# Patient Record
Sex: MALE | Race: WHITE | NOT HISPANIC OR LATINO | URBAN - METROPOLITAN AREA
[De-identification: names, ages, dates, MRNs, and addresses within clinical notes are randomized per-mention and may not be internally consistent; named-entity substitution may affect disease eponyms.]

---

## 2017-11-02 ENCOUNTER — INPATIENT (INPATIENT)
Facility: HOSPITAL | Age: 67
LOS: 9 days | Discharge: HOME | DRG: 841 | End: 2017-11-12
Attending: INTERNAL MEDICINE | Admitting: GENERAL ACUTE CARE HOSPITAL
Payer: COMMERCIAL

## 2017-11-02 VITALS
RESPIRATION RATE: 18 BRPM | HEART RATE: 82 BPM | TEMPERATURE: 99 F | OXYGEN SATURATION: 100 % | DIASTOLIC BLOOD PRESSURE: 59 MMHG | SYSTOLIC BLOOD PRESSURE: 85 MMHG

## 2017-11-02 DIAGNOSIS — D64.9 ANEMIA, UNSPECIFIED: ICD-10-CM

## 2017-11-02 LAB
ALBUMIN SERPL ELPH-MCNC: 3.1 G/DL — LOW (ref 3.3–5)
ALP SERPL-CCNC: 157 U/L — HIGH (ref 40–120)
ALT FLD-CCNC: 21 U/L RC — SIGNIFICANT CHANGE UP (ref 10–45)
ANION GAP SERPL CALC-SCNC: 15 MMOL/L — SIGNIFICANT CHANGE UP (ref 5–17)
APTT BLD: 28.3 SEC — SIGNIFICANT CHANGE UP (ref 27.5–37.4)
AST SERPL-CCNC: 31 U/L — SIGNIFICANT CHANGE UP (ref 10–40)
BILIRUB SERPL-MCNC: 0.9 MG/DL — SIGNIFICANT CHANGE UP (ref 0.2–1.2)
BLD GP AB SCN SERPL QL: NEGATIVE — SIGNIFICANT CHANGE UP
BUN SERPL-MCNC: 34 MG/DL — HIGH (ref 7–23)
CALCIUM SERPL-MCNC: 9.4 MG/DL — SIGNIFICANT CHANGE UP (ref 8.4–10.5)
CHLORIDE SERPL-SCNC: 106 MMOL/L — SIGNIFICANT CHANGE UP (ref 96–108)
CO2 SERPL-SCNC: 25 MMOL/L — SIGNIFICANT CHANGE UP (ref 22–31)
CREAT SERPL-MCNC: 1.75 MG/DL — HIGH (ref 0.5–1.3)
GLUCOSE BLDC GLUCOMTR-MCNC: 100 MG/DL — HIGH (ref 70–99)
GLUCOSE SERPL-MCNC: 115 MG/DL — HIGH (ref 70–99)
HCT VFR BLD CALC: 20.9 % — CRITICAL LOW (ref 39–50)
HGB BLD-MCNC: 7.1 G/DL — LOW (ref 13–17)
INR BLD: 1.43 RATIO — HIGH (ref 0.88–1.16)
MCHC RBC-ENTMCNC: 34.1 GM/DL — SIGNIFICANT CHANGE UP (ref 32–36)
MCHC RBC-ENTMCNC: 34.5 PG — HIGH (ref 27–34)
MCV RBC AUTO: 101 FL — HIGH (ref 80–100)
PLATELET # BLD AUTO: 22 K/UL — LOW (ref 150–400)
POTASSIUM SERPL-MCNC: 3.5 MMOL/L — SIGNIFICANT CHANGE UP (ref 3.5–5.3)
POTASSIUM SERPL-SCNC: 3.5 MMOL/L — SIGNIFICANT CHANGE UP (ref 3.5–5.3)
PROT SERPL-MCNC: 5.5 G/DL — LOW (ref 6–8.3)
PROTHROM AB SERPL-ACNC: 15.7 SEC — HIGH (ref 9.8–12.7)
RBC # BLD: 2.06 M/UL — LOW (ref 4.2–5.8)
RBC # FLD: 20.4 % — HIGH (ref 10.3–14.5)
RH IG SCN BLD-IMP: POSITIVE — SIGNIFICANT CHANGE UP
SODIUM SERPL-SCNC: 146 MMOL/L — HIGH (ref 135–145)
WBC # BLD: 30.9 K/UL — HIGH (ref 3.8–10.5)
WBC # FLD AUTO: 30.9 K/UL — HIGH (ref 3.8–10.5)

## 2017-11-02 PROCEDURE — 71010: CPT | Mod: 26

## 2017-11-02 PROCEDURE — 99285 EMERGENCY DEPT VISIT HI MDM: CPT

## 2017-11-02 RX ORDER — ALLOPURINOL 300 MG
0 TABLET ORAL
Qty: 0 | Refills: 0 | COMMUNITY

## 2017-11-02 RX ORDER — DIPHENHYDRAMINE HCL 50 MG
50 CAPSULE ORAL ONCE
Qty: 0 | Refills: 0 | Status: COMPLETED | OUTPATIENT
Start: 2017-11-02 | End: 2017-11-02

## 2017-11-02 RX ORDER — ALPRAZOLAM 0.25 MG
0.5 TABLET ORAL DAILY
Qty: 0 | Refills: 0 | Status: DISCONTINUED | OUTPATIENT
Start: 2017-11-02 | End: 2017-11-09

## 2017-11-02 RX ORDER — DIPHENOXYLATE HCL/ATROPINE 2.5-.025MG
0 TABLET ORAL
Qty: 0 | Refills: 0 | COMMUNITY

## 2017-11-02 RX ORDER — MAGNESIUM OXIDE 400 MG ORAL TABLET 241.3 MG
400 TABLET ORAL
Qty: 0 | Refills: 0 | Status: DISCONTINUED | OUTPATIENT
Start: 2017-11-02 | End: 2017-11-10

## 2017-11-02 RX ORDER — LATANOPROST 0.05 MG/ML
1 SOLUTION/ DROPS OPHTHALMIC; TOPICAL AT BEDTIME
Qty: 0 | Refills: 0 | Status: DISCONTINUED | OUTPATIENT
Start: 2017-11-02 | End: 2017-11-02

## 2017-11-02 RX ORDER — ACYCLOVIR SODIUM 500 MG
400 VIAL (EA) INTRAVENOUS
Qty: 0 | Refills: 0 | Status: DISCONTINUED | OUTPATIENT
Start: 2017-11-02 | End: 2017-11-12

## 2017-11-02 RX ORDER — SODIUM CHLORIDE 9 MG/ML
1000 INJECTION INTRAMUSCULAR; INTRAVENOUS; SUBCUTANEOUS
Qty: 0 | Refills: 0 | Status: DISCONTINUED | OUTPATIENT
Start: 2017-11-02 | End: 2017-11-03

## 2017-11-02 RX ORDER — TIMOLOL 0.5 %
1 DROPS OPHTHALMIC (EYE) DAILY
Qty: 0 | Refills: 0 | Status: DISCONTINUED | OUTPATIENT
Start: 2017-11-02 | End: 2017-11-12

## 2017-11-02 RX ORDER — DIPHENOXYLATE HCL/ATROPINE 2.5-.025MG
1 TABLET ORAL THREE TIMES A DAY
Qty: 0 | Refills: 0 | Status: DISCONTINUED | OUTPATIENT
Start: 2017-11-02 | End: 2017-11-07

## 2017-11-02 RX ORDER — MAGNESIUM OXIDE/MAG AA CHELATE 133 MG
0 TABLET ORAL
Qty: 0 | Refills: 0 | COMMUNITY

## 2017-11-02 RX ORDER — ACYCLOVIR SODIUM 500 MG
0 VIAL (EA) INTRAVENOUS
Qty: 0 | Refills: 0 | COMMUNITY

## 2017-11-02 RX ORDER — SODIUM CHLORIDE 9 MG/ML
1000 INJECTION, SOLUTION INTRAVENOUS
Qty: 0 | Refills: 0 | Status: DISCONTINUED | OUTPATIENT
Start: 2017-11-02 | End: 2017-11-02

## 2017-11-02 RX ORDER — POSACONAZOLE 100 MG/1
0 TABLET, DELAYED RELEASE ORAL
Qty: 0 | Refills: 0 | COMMUNITY

## 2017-11-02 RX ORDER — ALLOPURINOL 300 MG
300 TABLET ORAL DAILY
Qty: 0 | Refills: 0 | Status: DISCONTINUED | OUTPATIENT
Start: 2017-11-02 | End: 2017-11-02

## 2017-11-02 RX ORDER — LOPERAMIDE HCL 2 MG
0 TABLET ORAL
Qty: 0 | Refills: 0 | COMMUNITY

## 2017-11-02 RX ORDER — ALLOPURINOL 300 MG
1 TABLET ORAL
Qty: 0 | Refills: 0 | COMMUNITY

## 2017-11-02 RX ORDER — METOPROLOL TARTRATE 50 MG
751 TABLET ORAL
Qty: 0 | Refills: 0 | COMMUNITY

## 2017-11-02 RX ORDER — POSACONAZOLE 100 MG/1
100 TABLET, DELAYED RELEASE ORAL DAILY
Qty: 0 | Refills: 0 | Status: DISCONTINUED | OUTPATIENT
Start: 2017-11-02 | End: 2017-11-12

## 2017-11-02 RX ORDER — ACETAMINOPHEN 500 MG
975 TABLET ORAL ONCE
Qty: 0 | Refills: 0 | Status: DISCONTINUED | OUTPATIENT
Start: 2017-11-02 | End: 2017-11-02

## 2017-11-02 RX ORDER — BACITRACIN ZINC 500 UNIT/G
1 OINTMENT IN PACKET (EA) TOPICAL DAILY
Qty: 0 | Refills: 0 | Status: DISCONTINUED | OUTPATIENT
Start: 2017-11-02 | End: 2017-11-12

## 2017-11-02 RX ORDER — METOPROLOL TARTRATE 50 MG
0 TABLET ORAL
Qty: 0 | Refills: 0 | COMMUNITY

## 2017-11-02 RX ORDER — DIPHENHYDRAMINE HCL 50 MG
25 CAPSULE ORAL ONCE
Qty: 0 | Refills: 0 | Status: DISCONTINUED | OUTPATIENT
Start: 2017-11-02 | End: 2017-11-02

## 2017-11-02 RX ORDER — METOPROLOL TARTRATE 50 MG
25 TABLET ORAL DAILY
Qty: 0 | Refills: 0 | Status: DISCONTINUED | OUTPATIENT
Start: 2017-11-02 | End: 2017-11-02

## 2017-11-02 RX ORDER — LATANOPROST 0.05 MG/ML
1 SOLUTION/ DROPS OPHTHALMIC; TOPICAL
Qty: 0 | Refills: 0 | COMMUNITY

## 2017-11-02 RX ORDER — MORPHINE 10 MG/ML
6 SOLUTION ORAL
Qty: 0 | Refills: 0 | Status: DISCONTINUED | OUTPATIENT
Start: 2017-11-02 | End: 2017-11-03

## 2017-11-02 RX ORDER — ACETAMINOPHEN 500 MG
975 TABLET ORAL ONCE
Qty: 0 | Refills: 0 | Status: COMPLETED | OUTPATIENT
Start: 2017-11-02 | End: 2017-11-02

## 2017-11-02 RX ORDER — LOPERAMIDE HCL 2 MG
2 TABLET ORAL
Qty: 0 | Refills: 0 | Status: DISCONTINUED | OUTPATIENT
Start: 2017-11-02 | End: 2017-11-12

## 2017-11-02 RX ORDER — ALLOPURINOL 300 MG
300 TABLET ORAL DAILY
Qty: 0 | Refills: 0 | Status: DISCONTINUED | OUTPATIENT
Start: 2017-11-02 | End: 2017-11-12

## 2017-11-02 RX ORDER — LANOLIN ALCOHOL/MO/W.PET/CERES
3 CREAM (GRAM) TOPICAL AT BEDTIME
Qty: 0 | Refills: 0 | Status: DISCONTINUED | OUTPATIENT
Start: 2017-11-02 | End: 2017-11-12

## 2017-11-02 RX ORDER — LATANOPROST 0.05 MG/ML
1 SOLUTION/ DROPS OPHTHALMIC; TOPICAL AT BEDTIME
Qty: 0 | Refills: 0 | Status: DISCONTINUED | OUTPATIENT
Start: 2017-11-02 | End: 2017-11-12

## 2017-11-02 RX ORDER — RAMELTEON 8 MG
0 TABLET ORAL
Qty: 0 | Refills: 0 | COMMUNITY

## 2017-11-02 RX ORDER — TIMOLOL 0.5 %
1 DROPS OPHTHALMIC (EYE)
Qty: 0 | Refills: 0 | COMMUNITY

## 2017-11-02 RX ORDER — NYSTATIN CREAM 100000 [USP'U]/G
0 CREAM TOPICAL
Qty: 0 | Refills: 0 | COMMUNITY

## 2017-11-02 RX ADMIN — Medication 975 MILLIGRAM(S): at 19:05

## 2017-11-02 RX ADMIN — Medication 50 MILLIGRAM(S): at 18:00

## 2017-11-02 RX ADMIN — Medication 975 MILLIGRAM(S): at 18:00

## 2017-11-02 NOTE — H&P ADULT - NSHPPHYSICALEXAM_GEN_ALL_CORE
PHYSICAL EXAMINATION:  Vital Signs Last 24 Hrs  T(C): 36.6 (02 Nov 2017 14:29), Max: 37.2 (02 Nov 2017 13:59)  T(F): 97.8 (02 Nov 2017 14:29), Max: 98.9 (02 Nov 2017 13:59)  HR: 79 (02 Nov 2017 14:29) (79 - 82)  BP: 98/59 (02 Nov 2017 14:29) (85/59 - 98/59)  BP(mean): --  RR: 18 (02 Nov 2017 14:29) (18 - 18)  SpO2: 100% (02 Nov 2017 14:29) (100% - 100%)  CAPILLARY BLOOD GLUCOSE            GENERAL: NAD, well-groomed, well-developed  HEAD:  atraumatic, normocephalic  EYES: sclera anicteric  ENMT: mucous membranes moist  NECK: supple, No JVD  CHEST/LUNG: clear to auscultation bilaterally; no rales, rhonchi, or wheezing b/l  HEART: normal S1, S2  ABDOMEN: BS+, soft, ND, NT   EXTREMITIES:  pulses palpable; no clubbing, cyanosis, or edema b/l LEs  NEURO: awake, alert, interactive; moves all extremities  SKIN: no rashes or lesions PHYSICAL EXAMINATION:  Vital Signs Last 24 Hrs  T(C): 36.6 (02 Nov 2017 14:29), Max: 37.2 (02 Nov 2017 13:59)  T(F): 97.8 (02 Nov 2017 14:29), Max: 98.9 (02 Nov 2017 13:59)  HR: 79 (02 Nov 2017 14:29) (79 - 82)  BP: 98/59 (02 Nov 2017 14:29) (85/59 - 98/59)  BP(mean): --  RR: 18 (02 Nov 2017 14:29) (18 - 18)  SpO2: 100% (02 Nov 2017 14:29) (100% - 100%)  CAPILLARY BLOOD GLUCOSE            GENERAL: NAD, well-groomed, well-developed  HEAD:  atraumatic, normocephalic  EYES: sclera anicteric  ENMT: mucous membranes moist  NECK: supple, No JVD  CHEST/LUNG: clear to auscultation bilaterally; no rales, rhonchi, or wheezing b/l  HEART: normal S1, S2  ABDOMEN: BS+, soft, ND, NT , ecchymoses  on  anterior  abdominal   wall, , firm  mass, llq.  famliy  states its  from  heparin injections  EXTREMITIES:    no  edema b/l LEs  NEURO: awake, alert, interactive; moves all extremities  SKIN: no rashes or lesions PHYSICAL EXAMINATION:  Vital Signs Last 24 Hrs  T(C): 36.6 (02 Nov 2017 14:29), Max: 37.2 (02 Nov 2017 13:59)  T(F): 97.8 (02 Nov 2017 14:29), Max: 98.9 (02 Nov 2017 13:59)  HR: 79 (02 Nov 2017 14:29) (79 - 82)  BP: 98/59 (02 Nov 2017 14:29) (85/59 - 98/59)  BP(mean): --  RR: 18 (02 Nov 2017 14:29) (18 - 18)  SpO2: 100% (02 Nov 2017 14:29) (100% - 100%)  CAPILLARY BLOOD GLUCOSE            GENERAL: NAD, well-groomed, well-developed  HEAD:  atraumatic, normocephalic  EYES: sclera anicteric  ENMT: mucous membranes moist  NECK: supple, No JVD  CHEST/LUNG: clear to auscultation bilaterally; no rales, rhonchi, or wheezing b/l  HEART: normal S1, S2  ABDOMEN: BS+, soft, ND, NT , ecchymoses  on  anterior  abdominal   wall, , firm  mass, llq.  famliy  states its  from  heparin injections  EXTREMITIES:    no  edema b/l LEs  NEURO: awake, alert, interactive; moves all extremities  SKIN:  ecchymoses,  anterior  abd  wall,  has  PICc  line, left  arm,  with  small  area of open wound, at site od  picc line, dressing

## 2017-11-02 NOTE — H&P ADULT - ASSESSMENT
pt  with  h/o  MDS, now  with  AML, s/op  mlple  trials  at  [facility]  sent  to  er  for  prbc   onc  dr llanos  will  see pt   pt  will be  place d on  a novel  oral  agent daily a nd  s/q  Theodora C,  daily  for  5  days  labs, pending  today    daily  cbc  dvt  ppx pt  with  h/o  MDS, now  with  AML, s/ p  mlple  trials  at  [facility], with progression  of  disease  sent  to  er  for  prbc   onc  dr llanos  will  see pt  on acyclovir/ Augmentin  and posaconazole  , daily  for  prophylaxis   pt  will be  place d on  a novel  oral  agent daily .  and   s/q  Theodora C,  daily  for  5  days  labs, pending  today    daily  cbc  dvt  ppx  spoke  with  wife  dr berkowitz  will assume  care of  pt pt  with  h/o  MDS, now  with  AML, s/ p  mlple  trials  at  [facility], with progression  of  disease  sent  to  er  for  prbc   onc  dr llanos  will  see pt  on acyclovir/ Augmentin  and posaconazole  , daily  for  prophylaxis   pt  will be  place d on  a novel  oral  agent daily .  and   s/q  Theodora C,  daily  for  5  days  labs, pending  today    daily  cbc  di, in  er    pt  with  hypotension in  er,  dc  lopressor  on iv  n  saline,  follow  bp  curve  dvt  ppx  spoke  with  wife  dr berkowitz  will assume  care of  pt pt  with  h/o  MDS, now  with  AML, s/ p  mlple  trials  at  [facility], with progression  of  disease  sent  to  er  for  prbc   onc  dr llanos  will  see pt  on acyclovir/ Augmentin  and posaconazole  , daily  for  prophylaxis   pt  will be  place d on  a novel  oral  agent daily .  and   s/q  Theodora C,  daily  for  5  days  labs, pending  today    daily  cbc  di, in  er    pt  with  hypotension in  er,  dc  lopressor  on iv  n  saline,  follow  bp  curve  PICC line, left upper  arm  dvt  ppx  spoke  with  wife  dr berkowitz  will assume  care of  pt

## 2017-11-02 NOTE — ED PROVIDER NOTE - ATTENDING CONTRIBUTION TO CARE
68 yo M present with anemia. He had bloodwork done today which demonstrated Hb7. His normal ranges 8-10. He has a h/o MDS and leukemia, on chemotherapy. Follows at [facility]. is transfusion dependent. last transfusion was last week. For the past 4 days he has felt extremely generalized weakness and with no energy.   PE with chronicallly ill appearing. pale.. brusing on his abdomen 66 yo M present with anemia. He had bloodwork done today which demonstrated Hb7. His normal ranges 8-10. He has a h/o MDS and leukemia, on chemotherapy. Follows at [facility]. is transfusion dependent. last transfusion was last week. For the past 4 days he has felt extremely generalized weakness and with no energy.   PE with chronicallly ill appearing. pale.. bruising on his abdomen, otherwise nontender to palpation  VS stable.  ED workup reveals H/H 7.9 and 26. JONNA with BUN 30, CR 1.46. We consulted hem/onc who asked us to admit patietn to the hospitalist. Dr. Roland came to evaluate patient herself in the ER. Lab results pending at time of admission, Dr Rosa stated that she would follow results and order transfuse as indicated herself    Based on patient's HPI as well as the results of today's workup, I felt that patient warranted admission to the hospital for further workup/evaluation and continued management. I discussed the findings of today's workup with the patient and addressed the patient's questions and concerns. The patient was agreeable with admission. I spoke with the hospitalist who accepted the patient for admission and subsequently took over the patient's care.

## 2017-11-02 NOTE — ED PROVIDER NOTE - MEDICAL DECISION MAKING DETAILS
Shanthi PGY3: anemia in leukemia patient requiring admission for new chemotherapy. Will obtain consent and transfuse and coordinate plan with heme/onc team

## 2017-11-02 NOTE — ED PROVIDER NOTE - PHYSICAL EXAMINATION
Shanthi: A & O x 3, NAD, HEENT WNL and no facial asymmetry; lungs CTAB, heart with reg rhythm; abdomen soft obese NTND; extremities with picc line on left; skin with no rashes, neuro exam non focal with no motor or sensory deficits Shanthi: A & O x 3, NAD, HEENT WNL and no facial asymmetry; dry MM, lungs CTAB, heart with reg rhythm; abdomen soft obese NTND; extremities with picc line on left; skin with no rashes, neuro exam non focal with no motor or sensory deficits, back with no spinal TTP

## 2017-11-02 NOTE — H&P ADULT - HISTORY OF PRESENT ILLNESS
67y Male complaining of  weakness	  : 67 year old, leukemia / AML,  progressed  from , MDS,    h/o  dm, presenting with low h/h.  patient follows with [facility] and has  had  mlple  transfusions, . a nd  was  on  numerous  trials, which  have  not  worked, .  ,  denies,  blood in stool. no vomiting. no fever . Sent in for  transfusion and inpatient chemo that he was recently approved for with insurance. pos fatigue and diarrhea. history of diarrhea but neg c diff. Currently on augmentin and pozaconaoxole.    Onc: [facility] PMD: Bob Christine ([facility]) 67y Male complaining of  weakness,  sent  to  er  bt  oncologist	  : 67 year old, leukemia / AML,  progressed  from , MDS,, now  with  anemia    h/o  dm,   patient follows with [facility] and has  had  mlple  transfusions, .  was  on  numerous  trials, which  have  not  worked, .  ,  denies,  blood in stool. no vomiting. no fever . Sent in for  transfusion and inpatient chemo that he was recently approved for with insurance. pos fatigue and diarrhea. history of diarrhea but neg c diff . Currently on augmentin and pozaconaoxole,  and  acyclovir,  fro  ppx.    Onc: [facility] 67y Male complaining of  weakness,  sent  to  er  by  oncologist, for  anemia	  : 67 year old, leukemia / AML,  progressed  from , MDS,, now  with  anemia    h/o  dm,   patient follows with [facility] and has  had  mlple  transfusions, .  was  on  numerous  trials, which  have  not  worked, .  ,  denies,  blood in stool. no vomiting. no fever . Sent in for  transfusion and inpatient chemo that he was recently approved for with insurance. pos fatigue and diarrhea. history of diarrhea but neg c diff . Currently on augmentin and pozaconaoxole,  and  acyclovir,  fro  ppx.    Onc: [facility]

## 2017-11-02 NOTE — ED PROVIDER NOTE - DIAGNOSIS COUNSELING, MDM
conducted a detailed discussion... I reviewed all lab and imaging results from this ED visit, and discussed ALL results with the patient, including all abnormal results and incidental findings. I recommened appropriate follow up for all incidental findings. The patient expressed understanding of all results discussed and follow up instructions given.

## 2017-11-02 NOTE — ED ADULT NURSE NOTE - OBJECTIVE STATEMENT
68 y/o male BIB wife for low h/h, pt sent in for transfusion.  Patient  denies blood in stool, vomiting, fever, chills.   Pt c/o feeling tired and diarrhea.  History of diarrhea.   Left arm PICC line.  (+) bruise to lower abd and buttock.  Stage II to sacrum  and surrounding redness.

## 2017-11-02 NOTE — ED PROVIDER NOTE - CARE PLAN
Principal Discharge DX:	Anemia Principal Discharge DX:	Anemia  Secondary Diagnosis:	Leukemia not having achieved remission, unspecified leukemia type

## 2017-11-02 NOTE — H&P ADULT - NSHPREVIEWOFSYSTEMS_GEN_ALL_CORE
REVIEW OF SYSTEMS:    CONSTITUTIONAL:   has  weakness, fevers or chills  EYES/ENT: No visual changes;    NECK: No pain   RESPIRATORY: No cough, wheezing, hemoptysis;  shortness of breath, on exertion  CARDIOVASCULAR: No chest pain or palpitations  GASTROINTESTINAL: No abdominal or epigastric pain. No nausea, vomiting, or hematemesis; No diarrhea or constipation. No melena or hematochezia.  GENITOURINARY: No dysuria, frequency   NEUROLOGICAL: No  focal  weakness  SKIN   No  rash  PSYCH    Alert REVIEW OF SYSTEMS:    CONSTITUTIONAL:   has  weakness, fevers or chills  EYES/ENT: No visual changes;    NECK: No pain   RESPIRATORY: No cough, wheezing, hemoptysis;  shortness of breath, on exertion  CARDIOVASCULAR: No chest pain or palpitations  GASTROINTESTINAL: No abdominal or epigastric pain. No nausea, vomiting, or hematemesis; . c/c  diarrhea,    no  constipation. No melena or hematochezia.  GENITOURINARY: No dysuria, frequency   NEUROLOGICAL: No  focal  weakness  SKIN   No  rash  PSYCH    Alert

## 2017-11-02 NOTE — ED PROVIDER NOTE - OBJECTIVE STATEMENT
67 year old, leukemia (diagnosed in april), mds, dm, presenting with low h/h. patient follows with [facility] and has sometimes many transfusions. no blood in stool. no vomiting. no fever. Sent in for acute transfusion and inpatient chemo that he was recently approved for with insurance. pos fatigue and diarrhea. history of diarrhea but neg c diff. Currently on augmentin and pozaconaoxole.     Onc: [facility]  PMD: Bob Christine ([facility])

## 2017-11-03 LAB
ALBUMIN SERPL ELPH-MCNC: 3.2 G/DL — LOW (ref 3.3–5)
ALP SERPL-CCNC: 154 U/L — HIGH (ref 40–120)
ALT FLD-CCNC: 17 U/L RC — SIGNIFICANT CHANGE UP (ref 10–45)
ANION GAP SERPL CALC-SCNC: 10 MMOL/L — SIGNIFICANT CHANGE UP (ref 5–17)
ANION GAP SERPL CALC-SCNC: 12 MMOL/L — SIGNIFICANT CHANGE UP (ref 5–17)
AST SERPL-CCNC: 32 U/L — SIGNIFICANT CHANGE UP (ref 10–40)
BASE EXCESS BLDV CALC-SCNC: 3.9 MMOL/L — HIGH (ref -2–2)
BASOPHILS # BLD AUTO: 0 K/UL — SIGNIFICANT CHANGE UP (ref 0–0.2)
BILIRUB SERPL-MCNC: 0.8 MG/DL — SIGNIFICANT CHANGE UP (ref 0.2–1.2)
BUN SERPL-MCNC: 23 MG/DL — SIGNIFICANT CHANGE UP (ref 7–23)
BUN SERPL-MCNC: 28 MG/DL — HIGH (ref 7–23)
CA-I SERPL-SCNC: 1.23 MMOL/L — SIGNIFICANT CHANGE UP (ref 1.12–1.3)
CALCIUM SERPL-MCNC: 8.8 MG/DL — SIGNIFICANT CHANGE UP (ref 8.4–10.5)
CALCIUM SERPL-MCNC: 9.2 MG/DL — SIGNIFICANT CHANGE UP (ref 8.4–10.5)
CHLORIDE BLDV-SCNC: 109 MMOL/L — HIGH (ref 96–108)
CHLORIDE SERPL-SCNC: 105 MMOL/L — SIGNIFICANT CHANGE UP (ref 96–108)
CHLORIDE SERPL-SCNC: 108 MMOL/L — SIGNIFICANT CHANGE UP (ref 96–108)
CO2 BLDV-SCNC: 29 MMOL/L — SIGNIFICANT CHANGE UP (ref 22–30)
CO2 SERPL-SCNC: 27 MMOL/L — SIGNIFICANT CHANGE UP (ref 22–31)
CO2 SERPL-SCNC: 29 MMOL/L — SIGNIFICANT CHANGE UP (ref 22–31)
CREAT SERPL-MCNC: 1.47 MG/DL — HIGH (ref 0.5–1.3)
CREAT SERPL-MCNC: 1.49 MG/DL — HIGH (ref 0.5–1.3)
EOSINOPHIL # BLD AUTO: 0 K/UL — SIGNIFICANT CHANGE UP (ref 0–0.5)
GAS PNL BLDV: 142 MMOL/L — SIGNIFICANT CHANGE UP (ref 136–145)
GAS PNL BLDV: SIGNIFICANT CHANGE UP
GAS PNL BLDV: SIGNIFICANT CHANGE UP
GLUCOSE BLDC GLUCOMTR-MCNC: 113 MG/DL — HIGH (ref 70–99)
GLUCOSE BLDC GLUCOMTR-MCNC: 119 MG/DL — HIGH (ref 70–99)
GLUCOSE BLDC GLUCOMTR-MCNC: 84 MG/DL — SIGNIFICANT CHANGE UP (ref 70–99)
GLUCOSE BLDC GLUCOMTR-MCNC: 93 MG/DL — SIGNIFICANT CHANGE UP (ref 70–99)
GLUCOSE BLDV-MCNC: 98 MG/DL — SIGNIFICANT CHANGE UP (ref 70–99)
GLUCOSE SERPL-MCNC: 102 MG/DL — HIGH (ref 70–99)
GLUCOSE SERPL-MCNC: 82 MG/DL — SIGNIFICANT CHANGE UP (ref 70–99)
HCO3 BLDV-SCNC: 28 MMOL/L — SIGNIFICANT CHANGE UP (ref 21–29)
HCT VFR BLD CALC: 22.7 % — LOW (ref 39–50)
HCT VFR BLD CALC: 25.8 % — LOW (ref 39–50)
HCT VFR BLDA CALC: 25 % — LOW (ref 39–50)
HGB BLD CALC-MCNC: 8 G/DL — LOW (ref 13–17)
HGB BLD-MCNC: 7.7 G/DL — LOW (ref 13–17)
HGB BLD-MCNC: 9.1 G/DL — LOW (ref 13–17)
LACTATE BLDV-MCNC: 1.5 MMOL/L — SIGNIFICANT CHANGE UP (ref 0.7–2)
LYMPHOCYTES # BLD AUTO: 19 % — SIGNIFICANT CHANGE UP (ref 13–44)
LYMPHOCYTES # BLD AUTO: 3.5 K/UL — HIGH (ref 1–3.3)
MAGNESIUM SERPL-MCNC: 1.9 MG/DL — SIGNIFICANT CHANGE UP (ref 1.6–2.6)
MCHC RBC-ENTMCNC: 33 PG — SIGNIFICANT CHANGE UP (ref 27–34)
MCHC RBC-ENTMCNC: 33.3 PG — SIGNIFICANT CHANGE UP (ref 27–34)
MCHC RBC-ENTMCNC: 34.2 GM/DL — SIGNIFICANT CHANGE UP (ref 32–36)
MCHC RBC-ENTMCNC: 35.2 GM/DL — SIGNIFICANT CHANGE UP (ref 32–36)
MCV RBC AUTO: 94.6 FL — SIGNIFICANT CHANGE UP (ref 80–100)
MCV RBC AUTO: 96.5 FL — SIGNIFICANT CHANGE UP (ref 80–100)
MONOCYTES # BLD AUTO: 3.9 K/UL — HIGH (ref 0–0.9)
MONOCYTES NFR BLD AUTO: 16 % — HIGH (ref 2–14)
NEUTROPHILS # BLD AUTO: SIGNIFICANT CHANGE UP (ref 1.8–7.4)
NEUTROPHILS NFR BLD AUTO: 0 % — SIGNIFICANT CHANGE UP (ref 43–77)
OTHER CELLS CSF MANUAL: 6 ML/DL — LOW (ref 18–22)
PCO2 BLDV: 40 MMHG — SIGNIFICANT CHANGE UP (ref 35–50)
PH BLDV: 7.46 — HIGH (ref 7.35–7.45)
PHOSPHATE SERPL-MCNC: 2.9 MG/DL — SIGNIFICANT CHANGE UP (ref 2.5–4.5)
PHOSPHATE SERPL-MCNC: 3.3 MG/DL — SIGNIFICANT CHANGE UP (ref 2.5–4.5)
PLATELET # BLD AUTO: 15 K/UL — CRITICAL LOW (ref 150–400)
PLATELET # BLD AUTO: 38 K/UL — LOW (ref 150–400)
PO2 BLDV: 31 MMHG — SIGNIFICANT CHANGE UP (ref 25–45)
POTASSIUM BLDV-SCNC: 2.7 MMOL/L — CRITICAL LOW (ref 3.5–5)
POTASSIUM SERPL-MCNC: 2.9 MMOL/L — CRITICAL LOW (ref 3.5–5.3)
POTASSIUM SERPL-MCNC: 4.2 MMOL/L — SIGNIFICANT CHANGE UP (ref 3.5–5.3)
POTASSIUM SERPL-SCNC: 2.9 MMOL/L — CRITICAL LOW (ref 3.5–5.3)
POTASSIUM SERPL-SCNC: 4.2 MMOL/L — SIGNIFICANT CHANGE UP (ref 3.5–5.3)
PROT SERPL-MCNC: 5.4 G/DL — LOW (ref 6–8.3)
RBC # BLD: 2.35 M/UL — LOW (ref 4.2–5.8)
RBC # BLD: 2.73 M/UL — LOW (ref 4.2–5.8)
RBC # FLD: 21.3 % — HIGH (ref 10.3–14.5)
RBC # FLD: 22.6 % — HIGH (ref 10.3–14.5)
SAO2 % BLDV: 57 % — LOW (ref 67–88)
SODIUM SERPL-SCNC: 144 MMOL/L — SIGNIFICANT CHANGE UP (ref 135–145)
SODIUM SERPL-SCNC: 147 MMOL/L — HIGH (ref 135–145)
URATE SERPL-MCNC: 6.5 MG/DL — SIGNIFICANT CHANGE UP (ref 3.4–8.8)
WBC # BLD: 23.8 K/UL — HIGH (ref 3.8–10.5)
WBC # BLD: 39.7 K/UL — HIGH (ref 3.8–10.5)
WBC # FLD AUTO: 23.8 K/UL — HIGH (ref 3.8–10.5)
WBC # FLD AUTO: 39.7 K/UL — HIGH (ref 3.8–10.5)

## 2017-11-03 PROCEDURE — 85060 BLOOD SMEAR INTERPRETATION: CPT

## 2017-11-03 RX ORDER — ONDANSETRON 8 MG/1
16 TABLET, FILM COATED ORAL DAILY
Qty: 0 | Refills: 0 | Status: COMPLETED | OUTPATIENT
Start: 2017-11-03 | End: 2017-11-05

## 2017-11-03 RX ORDER — SODIUM CHLORIDE 9 MG/ML
1000 INJECTION, SOLUTION INTRAVENOUS
Qty: 0 | Refills: 0 | Status: DISCONTINUED | OUTPATIENT
Start: 2017-11-03 | End: 2017-11-08

## 2017-11-03 RX ORDER — MORPHINE 10 MG/ML
6 SOLUTION ORAL
Qty: 0 | Refills: 0 | Status: DISCONTINUED | OUTPATIENT
Start: 2017-11-03 | End: 2017-11-05

## 2017-11-03 RX ORDER — ACETAMINOPHEN 500 MG
650 TABLET ORAL ONCE
Qty: 0 | Refills: 0 | Status: COMPLETED | OUTPATIENT
Start: 2017-11-03 | End: 2017-11-03

## 2017-11-03 RX ORDER — SODIUM CHLORIDE 9 MG/ML
1000 INJECTION, SOLUTION INTRAVENOUS
Qty: 0 | Refills: 0 | Status: DISCONTINUED | OUTPATIENT
Start: 2017-11-03 | End: 2017-11-03

## 2017-11-03 RX ORDER — MAGNESIUM SULFATE 500 MG/ML
1 VIAL (ML) INJECTION ONCE
Qty: 0 | Refills: 0 | Status: DISCONTINUED | OUTPATIENT
Start: 2017-11-03 | End: 2017-11-03

## 2017-11-03 RX ORDER — DIPHENHYDRAMINE HCL 50 MG
50 CAPSULE ORAL EVERY 4 HOURS
Qty: 0 | Refills: 0 | Status: DISCONTINUED | OUTPATIENT
Start: 2017-11-03 | End: 2017-11-04

## 2017-11-03 RX ORDER — POTASSIUM CHLORIDE 20 MEQ
40 PACKET (EA) ORAL EVERY 4 HOURS
Qty: 0 | Refills: 0 | Status: COMPLETED | OUTPATIENT
Start: 2017-11-03 | End: 2017-11-03

## 2017-11-03 RX ORDER — POTASSIUM CHLORIDE 20 MEQ
10 PACKET (EA) ORAL
Qty: 0 | Refills: 0 | Status: DISCONTINUED | OUTPATIENT
Start: 2017-11-03 | End: 2017-11-03

## 2017-11-03 RX ORDER — VENETOCLAX 100 MG/1
150 TABLET, FILM COATED ORAL ONCE
Qty: 0 | Refills: 0 | Status: COMPLETED | OUTPATIENT
Start: 2017-11-03 | End: 2017-11-03

## 2017-11-03 RX ORDER — POTASSIUM CHLORIDE 20 MEQ
40 PACKET (EA) ORAL ONCE
Qty: 0 | Refills: 0 | Status: COMPLETED | OUTPATIENT
Start: 2017-11-03 | End: 2017-11-03

## 2017-11-03 RX ORDER — POTASSIUM CHLORIDE 20 MEQ
20 PACKET (EA) ORAL
Qty: 0 | Refills: 0 | Status: DISCONTINUED | OUTPATIENT
Start: 2017-11-03 | End: 2017-11-03

## 2017-11-03 RX ORDER — FUROSEMIDE 40 MG
20 TABLET ORAL ONCE
Qty: 0 | Refills: 0 | Status: COMPLETED | OUTPATIENT
Start: 2017-11-03 | End: 2017-11-03

## 2017-11-03 RX ORDER — CYTARABINE 100 MG
46 VIAL (EA) INJECTION DAILY
Qty: 0 | Refills: 0 | Status: DISCONTINUED | OUTPATIENT
Start: 2017-11-03 | End: 2017-11-12

## 2017-11-03 RX ADMIN — Medication 3 MILLIGRAM(S): at 22:51

## 2017-11-03 RX ADMIN — MAGNESIUM OXIDE 400 MG ORAL TABLET 400 MILLIGRAM(S): 241.3 TABLET ORAL at 12:10

## 2017-11-03 RX ADMIN — Medication 1 TABLET(S): at 13:04

## 2017-11-03 RX ADMIN — MORPHINE 6 MILLIGRAM(S): 10 SOLUTION ORAL at 12:10

## 2017-11-03 RX ADMIN — Medication 400 MILLIGRAM(S): at 00:27

## 2017-11-03 RX ADMIN — Medication 40 MILLIEQUIVALENT(S): at 19:09

## 2017-11-03 RX ADMIN — LATANOPROST 1 DROP(S): 0.05 SOLUTION/ DROPS OPHTHALMIC; TOPICAL at 21:27

## 2017-11-03 RX ADMIN — Medication 40 MILLIEQUIVALENT(S): at 16:38

## 2017-11-03 RX ADMIN — POSACONAZOLE 100 MILLIGRAM(S): 100 TABLET, DELAYED RELEASE ORAL at 14:42

## 2017-11-03 RX ADMIN — Medication 400 MILLIGRAM(S): at 06:41

## 2017-11-03 RX ADMIN — LATANOPROST 1 DROP(S): 0.05 SOLUTION/ DROPS OPHTHALMIC; TOPICAL at 00:29

## 2017-11-03 RX ADMIN — Medication 1 TABLET(S): at 21:27

## 2017-11-03 RX ADMIN — Medication 875 MILLIGRAM(S): at 06:41

## 2017-11-03 RX ADMIN — MORPHINE 6 MILLIGRAM(S): 10 SOLUTION ORAL at 06:41

## 2017-11-03 RX ADMIN — ONDANSETRON 116 MILLIGRAM(S): 8 TABLET, FILM COATED ORAL at 13:30

## 2017-11-03 RX ADMIN — Medication 100 MILLIEQUIVALENT(S): at 09:26

## 2017-11-03 RX ADMIN — VENETOCLAX 150 MILLIGRAM(S): 100 TABLET, FILM COATED ORAL at 14:00

## 2017-11-03 RX ADMIN — Medication 1 TABLET(S): at 00:29

## 2017-11-03 RX ADMIN — Medication 650 MILLIGRAM(S): at 13:04

## 2017-11-03 RX ADMIN — Medication 0.5 MILLIGRAM(S): at 12:48

## 2017-11-03 RX ADMIN — SODIUM CHLORIDE 75 MILLILITER(S): 9 INJECTION, SOLUTION INTRAVENOUS at 15:42

## 2017-11-03 RX ADMIN — Medication 300 MILLIGRAM(S): at 12:10

## 2017-11-03 RX ADMIN — Medication 400 MILLIGRAM(S): at 17:40

## 2017-11-03 RX ADMIN — MAGNESIUM OXIDE 400 MG ORAL TABLET 400 MILLIGRAM(S): 241.3 TABLET ORAL at 09:13

## 2017-11-03 RX ADMIN — Medication 50 MILLIEQUIVALENT(S): at 14:23

## 2017-11-03 RX ADMIN — Medication 1 APPLICATION(S): at 12:10

## 2017-11-03 RX ADMIN — Medication 40 MILLIEQUIVALENT(S): at 09:13

## 2017-11-03 RX ADMIN — Medication 875 MILLIGRAM(S): at 00:27

## 2017-11-03 RX ADMIN — Medication 3 MILLIGRAM(S): at 00:26

## 2017-11-03 RX ADMIN — Medication 100 MILLIEQUIVALENT(S): at 09:13

## 2017-11-03 RX ADMIN — MAGNESIUM OXIDE 400 MG ORAL TABLET 400 MILLIGRAM(S): 241.3 TABLET ORAL at 17:40

## 2017-11-03 RX ADMIN — MORPHINE 6 MILLIGRAM(S): 10 SOLUTION ORAL at 17:41

## 2017-11-03 RX ADMIN — Medication 875 MILLIGRAM(S): at 17:39

## 2017-11-03 RX ADMIN — Medication 20 MILLIGRAM(S): at 15:21

## 2017-11-03 RX ADMIN — Medication 50 MILLIGRAM(S): at 13:04

## 2017-11-03 RX ADMIN — Medication 1 TABLET(S): at 09:13

## 2017-11-03 RX ADMIN — Medication 1 DROP(S): at 14:42

## 2017-11-03 RX ADMIN — MORPHINE 6 MILLIGRAM(S): 10 SOLUTION ORAL at 01:26

## 2017-11-03 RX ADMIN — Medication 2 MILLIGRAM(S): at 12:09

## 2017-11-03 NOTE — PROVIDER CONTACT NOTE (CRITICAL VALUE NOTIFICATION) - ACTION/TREATMENT ORDERED:
DANYA Burns aware that k 2.9. , will continue to monitor pt NP Ely Burns aware that k 2.9.  Potassium supplements ordered and given to pt, will continue to monitor pt

## 2017-11-03 NOTE — PHARMACY COMMUNICATION NOTE - COMMENTS
venclexta recommended to start as initiation/ramp-up dosing.  Dr De Souza treating AML as opposed to CLL so wants to start with steady dose of 150mg.  Dr De Souza does not feel  that there is a high tumor lysis syndrome risk.   Giving a reduced 75% dose of 150mg as opposed to 600mg b/c pt concomitantly on posaconazole.

## 2017-11-03 NOTE — PROGRESS NOTE ADULT - ASSESSMENT
pt  with  h/o  MDS, now  with  AML, s/ p  mlple  trials  at  [facility], with progression  of  disease  sent  to  er  for  prbc   onc  dr llanos   on acyclovir/ Augmentin  and posaconazole  , daily  for  prophylaxis   pt   is on  an  experimental  oral  agent daily .  and   s/q  Theodora C,  daily  for  5  days  hb pf  7  today,  with  15, 000  platelets. needs  prbc  and platelet transfusions today    daily  cbc  di,   on iv fluids  hypokalemia, may  be  spurious, with   excessive blasts,  will obtain venous  blood  gas    pt  with  hypotension in  er,  dc  lopressor  on iv  n  saline,   PICC line, left upper  arm  dvt  ppx  asked  to  assume  care  of  pt  by  pt pt  with  h/o  MDS, now  with  AML, s/ p  mlple  trials  at  [facility], with progression  of  disease  sent  to  er  for  prbc   onc  dr llanos   on acyclovir/ Augmentin  and posaconazole  , daily  for  prophylaxis   pt   is on  an  experimental  oral  agent daily .  and   s/q  Theodora C,  daily  for  5  days  hb pf  7  today,  with  15, 000  platelets. needs  prbc  and platelet transfusions today    daily  cbc  di,   on iv fluids  hypokalemia, may  be  spurious, with   excessive blasts, ideally,   would  have  obtained venous  blood  gas  however,  potassium already  supplemented  by  NP on 3  roche    pt  with  hypotension in  er,  dc  lopressor  on iv   fluids  PICC line, left upper  arm  dvt  ppx  asked  to  assume  care  of  pt  by  pt

## 2017-11-03 NOTE — CONSULT NOTE ADULT - SUBJECTIVE AND OBJECTIVE BOX
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 had initial excellent response to decytabine but then suddenly progressed to acute leukemia  He was treated on a clinical trial at [facility] with brief good response followed by rapid relapse  Given his history of MDS and comorbid conditions--CAD, obesity, CKD 3, he is not candidate for standard chemotherapy  Venetoclax and Theodora -C have been recommended by Dr Hess at [facility] given recent positive experience with this regimen  Patient decided to have it done here locally after obtaining the Venetoclax from his pharmacy  Patient is on posoconazole and so dose of venetoclax is reduced markedly from 600 mg daily to 150 mg daily  PMH SH FH see attached, unchanged  comp ROS fatigue, difficulty ambulating    physical  obese  97.5 71 151/74 18 95 pct  lungs clear  cor s1s2  abd soft obese  ext no edema    data  wbc 20656 hgb 7.7 hct 22.7 plt [id number] 60 blasts, 19 lymphs 16 mono 5 pro  INR 1.43 PTT 28  K 2.9 Cr 1.49

## 2017-11-03 NOTE — CONSULT NOTE ADULT - ASSESSMENT
Acute myelogenous leukemia with h/o MDS, RAEB after initial response to decitabine  Brief response to clinical trial now Venetoclax and Theodora-C recommended  Venetoclax dose drastically reduced from 600 mg to 150 mg due to posaconazole  pt has own meds  Theodora-C 20 mg/m2 daily x 7  transfuse plt today  s/p pRBC yesterday, keep hgb greater than 7.5  tumor lysis labs this pm but unlikely  continue allopurinol

## 2017-11-03 NOTE — PROGRESS NOTE ADULT - SUBJECTIVE AND OBJECTIVE BOX
pt  in bed, weak    MEDICATIONS  (STANDING):  acyclovir   Tablet 400 milliGRAM(s) Oral two times a day  allopurinol 300 milliGRAM(s) Oral daily  ALPRAZolam 0.5 milliGRAM(s) Oral daily  amoxicillin/clavulanate Oral Tab/Cap - Peds 875 milliGRAM(s) Oral two times a day  BACItracin   Ointment 1 Application(s) Topical daily  diphenoxylate/atropine 1 Tablet(s) Oral three times a day  latanoprost 0.005% Ophthalmic Solution 1 Drop(s) Both EYES at bedtime  magnesium oxide 400 milliGRAM(s) Oral three times a day with meals  melatonin 3 milliGRAM(s) Oral at bedtime  posaconazole DR Tablet 100 milliGRAM(s) Oral daily  sodium chloride 0.9%. 1000 milliLiter(s) (75 mL/Hr) IV Continuous <Continuous>  timolol 0.25% Solution 1 Drop(s) Both EYES daily    MEDICATIONS  (PRN):  loperamide 2 milliGRAM(s) Oral four times a day PRN Diarrhea      Vital Signs Last 24 Hrs  T(C): 36.4 (03 Nov 2017 06:24), Max: 37.4 (02 Nov 2017 20:01)  T(F): 97.5 (03 Nov 2017 06:24), Max: 99.3 (02 Nov 2017 20:01)  HR: 71 (03 Nov 2017 06:48) (71 - 82)  BP: 151/74 (03 Nov 2017 06:24) (85/59 - 151/74)  BP(mean): --  RR: 18 (03 Nov 2017 06:24) (18 - 18)  SpO2: 95% (03 Nov 2017 06:48) (95% - 100%)  CAPILLARY BLOOD GLUCOSE      POCT Blood Glucose.: 84 mg/dL (03 Nov 2017 09:22)  POCT Blood Glucose.: 100 mg/dL (02 Nov 2017 20:49)    I&O's Summary    02 Nov 2017 07:01  -  03 Nov 2017 07:00  --------------------------------------------------------  IN: 700 mL / OUT: 450 mL / NET: 250 mL    03 Nov 2017 07:01  -  03 Nov 2017 10:56  --------------------------------------------------------  IN: 440 mL / OUT: 0 mL / NET: 440 mL        PHYSICAL EXAM:  HEAD:  Atraumatic, Normocephalic  NECK: Supple, No JVD  CHEST/LUNG: Clear to auscultation bilaterally; No wheeze  HEART: Regular rate and rhythm; No murmurs, rubs, or gallops  ABDOMEN: Soft, Nontender, ; Bowel sounds, ecchymoses/ hematoma, ant abdominal wall   EXTREMITIES:    no  edema  NEUROLOGY:  alert    LABS:                        7.7    23.8  )-----------( 15       ( 03 Nov 2017 06:42 )             22.7     11-03    147<H>  |  108  |  28<H>  ----------------------------<  82  2.9<LL>   |  27  |  1.49<H>    Ca    8.8      03 Nov 2017 06:42  Phos  2.9     11-03  Mg     1.9     11-03    TPro  5.5<L>  /  Alb  3.1<L>  /  TBili  0.9  /  DBili  x   /  AST  31  /  ALT  21  /  AlkPhos  157<H>  11-02    PT/INR - ( 02 Nov 2017 15:42 )   PT: 15.7 sec;   INR: 1.43 ratio         PTT - ( 02 Nov 2017 15:42 )  PTT:28.3 sec                    Consultant(s) Notes Reviewed:      Care Discussed with Consultants/Other Providers:

## 2017-11-04 LAB
ALBUMIN SERPL ELPH-MCNC: 2.9 G/DL — LOW (ref 3.3–5)
ALP SERPL-CCNC: 152 U/L — HIGH (ref 40–120)
ALT FLD-CCNC: 15 U/L RC — SIGNIFICANT CHANGE UP (ref 10–45)
ANION GAP SERPL CALC-SCNC: 10 MMOL/L — SIGNIFICANT CHANGE UP (ref 5–17)
AST SERPL-CCNC: 37 U/L — SIGNIFICANT CHANGE UP (ref 10–40)
BILIRUB SERPL-MCNC: 0.8 MG/DL — SIGNIFICANT CHANGE UP (ref 0.2–1.2)
BUN SERPL-MCNC: 30 MG/DL — HIGH (ref 7–23)
CALCIUM SERPL-MCNC: 9 MG/DL — SIGNIFICANT CHANGE UP (ref 8.4–10.5)
CHLORIDE SERPL-SCNC: 106 MMOL/L — SIGNIFICANT CHANGE UP (ref 96–108)
CO2 SERPL-SCNC: 28 MMOL/L — SIGNIFICANT CHANGE UP (ref 22–31)
CREAT SERPL-MCNC: 1.46 MG/DL — HIGH (ref 0.5–1.3)
GLUCOSE BLDC GLUCOMTR-MCNC: 110 MG/DL — HIGH (ref 70–99)
GLUCOSE BLDC GLUCOMTR-MCNC: 113 MG/DL — HIGH (ref 70–99)
GLUCOSE BLDC GLUCOMTR-MCNC: 114 MG/DL — HIGH (ref 70–99)
GLUCOSE BLDC GLUCOMTR-MCNC: 114 MG/DL — HIGH (ref 70–99)
GLUCOSE SERPL-MCNC: 113 MG/DL — HIGH (ref 70–99)
HCT VFR BLD CALC: 26.4 % — LOW (ref 39–50)
HGB BLD-MCNC: 9 G/DL — LOW (ref 13–17)
LDH SERPL L TO P-CCNC: 4800 U/L — HIGH (ref 50–242)
MAGNESIUM SERPL-MCNC: 2 MG/DL — SIGNIFICANT CHANGE UP (ref 1.6–2.6)
MCHC RBC-ENTMCNC: 32.5 PG — SIGNIFICANT CHANGE UP (ref 27–34)
MCHC RBC-ENTMCNC: 34.1 GM/DL — SIGNIFICANT CHANGE UP (ref 32–36)
MCV RBC AUTO: 95.1 FL — SIGNIFICANT CHANGE UP (ref 80–100)
PHOSPHATE SERPL-MCNC: 3.8 MG/DL — SIGNIFICANT CHANGE UP (ref 2.5–4.5)
PLATELET # BLD AUTO: 29 K/UL — LOW (ref 150–400)
POTASSIUM SERPL-MCNC: 4.1 MMOL/L — SIGNIFICANT CHANGE UP (ref 3.5–5.3)
POTASSIUM SERPL-SCNC: 4.1 MMOL/L — SIGNIFICANT CHANGE UP (ref 3.5–5.3)
PROT SERPL-MCNC: 5.3 G/DL — LOW (ref 6–8.3)
RBC # BLD: 2.77 M/UL — LOW (ref 4.2–5.8)
RBC # FLD: 21.3 % — HIGH (ref 10.3–14.5)
SODIUM SERPL-SCNC: 144 MMOL/L — SIGNIFICANT CHANGE UP (ref 135–145)
URATE SERPL-MCNC: 6.4 MG/DL — SIGNIFICANT CHANGE UP (ref 3.4–8.8)
WBC # BLD: 15.3 K/UL — HIGH (ref 3.8–10.5)
WBC # FLD AUTO: 15.3 K/UL — HIGH (ref 3.8–10.5)

## 2017-11-04 RX ORDER — DIPHENHYDRAMINE HCL 50 MG
50 CAPSULE ORAL ONCE
Qty: 0 | Refills: 0 | Status: COMPLETED | OUTPATIENT
Start: 2017-11-04 | End: 2017-11-09

## 2017-11-04 RX ORDER — HYDROXYZINE HCL 10 MG
50 TABLET ORAL
Qty: 0 | Refills: 0 | Status: DISCONTINUED | OUTPATIENT
Start: 2017-11-04 | End: 2017-11-04

## 2017-11-04 RX ADMIN — MORPHINE 6 MILLIGRAM(S): 10 SOLUTION ORAL at 00:16

## 2017-11-04 RX ADMIN — Medication 0.5 MILLIGRAM(S): at 11:58

## 2017-11-04 RX ADMIN — MORPHINE 6 MILLIGRAM(S): 10 SOLUTION ORAL at 06:12

## 2017-11-04 RX ADMIN — Medication 400 MILLIGRAM(S): at 06:10

## 2017-11-04 RX ADMIN — MORPHINE 6 MILLIGRAM(S): 10 SOLUTION ORAL at 11:59

## 2017-11-04 RX ADMIN — Medication 400 MILLIGRAM(S): at 17:46

## 2017-11-04 RX ADMIN — Medication 1 TABLET(S): at 13:51

## 2017-11-04 RX ADMIN — Medication 1 DROP(S): at 12:01

## 2017-11-04 RX ADMIN — Medication 875 MILLIGRAM(S): at 06:11

## 2017-11-04 RX ADMIN — Medication 2 MILLIGRAM(S): at 12:01

## 2017-11-04 RX ADMIN — ONDANSETRON 116 MILLIGRAM(S): 8 TABLET, FILM COATED ORAL at 13:50

## 2017-11-04 RX ADMIN — Medication 875 MILLIGRAM(S): at 17:46

## 2017-11-04 RX ADMIN — LATANOPROST 1 DROP(S): 0.05 SOLUTION/ DROPS OPHTHALMIC; TOPICAL at 21:28

## 2017-11-04 RX ADMIN — Medication 1 TABLET(S): at 06:11

## 2017-11-04 RX ADMIN — MAGNESIUM OXIDE 400 MG ORAL TABLET 400 MILLIGRAM(S): 241.3 TABLET ORAL at 17:46

## 2017-11-04 RX ADMIN — POSACONAZOLE 100 MILLIGRAM(S): 100 TABLET, DELAYED RELEASE ORAL at 12:00

## 2017-11-04 RX ADMIN — Medication 1 APPLICATION(S): at 12:00

## 2017-11-04 RX ADMIN — SODIUM CHLORIDE 75 MILLILITER(S): 9 INJECTION, SOLUTION INTRAVENOUS at 06:12

## 2017-11-04 RX ADMIN — MORPHINE 6 MILLIGRAM(S): 10 SOLUTION ORAL at 17:46

## 2017-11-04 RX ADMIN — MAGNESIUM OXIDE 400 MG ORAL TABLET 400 MILLIGRAM(S): 241.3 TABLET ORAL at 12:00

## 2017-11-04 RX ADMIN — Medication 300 MILLIGRAM(S): at 12:00

## 2017-11-04 RX ADMIN — Medication 1 TABLET(S): at 21:28

## 2017-11-04 RX ADMIN — MAGNESIUM OXIDE 400 MG ORAL TABLET 400 MILLIGRAM(S): 241.3 TABLET ORAL at 08:39

## 2017-11-04 RX ADMIN — Medication 3 MILLIGRAM(S): at 21:28

## 2017-11-04 NOTE — PROGRESS NOTE ADULT - SUBJECTIVE AND OBJECTIVE BOX
in bed.  needs to ambulated    MEDICATIONS  (STANDING):  acyclovir   Tablet 400 milliGRAM(s) Oral two times a day  allopurinol 300 milliGRAM(s) Oral daily  ALPRAZolam 0.5 milliGRAM(s) Oral daily  amoxicillin/clavulanate Oral Tab/Cap - Peds 875 milliGRAM(s) Oral two times a day  BACItracin   Ointment 1 Application(s) Topical daily  cytarabine Injectable 46 milliGRAM(s) SubCutaneous daily  dextrose 5% + sodium chloride 0.45% 1000 milliLiter(s) (75 mL/Hr) IV Continuous <Continuous>  diphenoxylate/atropine 1 Tablet(s) Oral three times a day  latanoprost 0.005% Ophthalmic Solution 1 Drop(s) Both EYES at bedtime  magnesium oxide 400 milliGRAM(s) Oral three times a day with meals  melatonin 3 milliGRAM(s) Oral at bedtime  ondansetron  IVPB 16 milliGRAM(s) IV Intermittent daily  opium Tincture 6 milliGRAM(s) Oral four times a day  posaconazole DR Tablet 100 milliGRAM(s) Oral daily  timolol 0.25% Solution 1 Drop(s) Both EYES daily    MEDICATIONS  (PRN):  hydrOXYzine hydrochloride 50 milliGRAM(s) Oral two times a day PRN Itching  loperamide 2 milliGRAM(s) Oral four times a day PRN Diarrhea      Vital Signs Last 24 Hrs  T(C): 37.1 (04 Nov 2017 05:21), Max: 37.3 (04 Nov 2017 00:32)  T(F): 98.8 (04 Nov 2017 05:21), Max: 99.2 (04 Nov 2017 00:32)  HR: 86 (04 Nov 2017 05:21) (70 - 90)  BP: 126/68 (04 Nov 2017 05:21) (102/56 - 134/73)  BP(mean): --  RR: 18 (04 Nov 2017 05:21) (18 - 18)  SpO2: 97% (04 Nov 2017 05:21) (95% - 97%)  CAPILLARY BLOOD GLUCOSE      POCT Blood Glucose.: 113 mg/dL (04 Nov 2017 07:31)  POCT Blood Glucose.: 93 mg/dL (03 Nov 2017 21:09)  POCT Blood Glucose.: 113 mg/dL (03 Nov 2017 16:15)  POCT Blood Glucose.: 119 mg/dL (03 Nov 2017 11:43)  POCT Blood Glucose.: 84 mg/dL (03 Nov 2017 09:22)    I&O's Summary    03 Nov 2017 07:01  -  04 Nov 2017 07:00  --------------------------------------------------------  IN: 2938 mL / OUT: 1625 mL / NET: 1313 mL        PHYSICAL EXAM:  HEAD:  Atraumatic, Normocephalic  NECK: Supple, No JVD  CHEST/LUNG: Clear to auscultation bilaterally; No wheeze  HEART: Regular rate and rhythm; No murmurs, rubs, or gallops  ABDOMEN: Soft, Nontender, ; Bowel sounds ,  ecchymoses, abdomen  EXTREMITIES:     edema  NEUROLOGY:  alert    LABS:                        9.0    15.3  )-----------( 29       ( 04 Nov 2017 07:55 )             26.4     11-03    144  |  105  |  23  ----------------------------<  102<H>  4.2   |  29  |  1.47<H>    Ca    9.2      03 Nov 2017 20:48  Phos  3.3     11-03  Mg     1.9     11-03    TPro  5.4<L>  /  Alb  3.2<L>  /  TBili  0.8  /  DBili  x   /  AST  32  /  ALT  17  /  AlkPhos  154<H>  11-03    PT/INR - ( 02 Nov 2017 15:42 )   PT: 15.7 sec;   INR: 1.43 ratio         PTT - ( 02 Nov 2017 15:42 )  PTT:28.3 sec            11-03 @ 13:05  2.7  31          Consultant(s) Notes Reviewed:      Care Discussed with Consultants/Other Providers:

## 2017-11-04 NOTE — PROGRESS NOTE ADULT - SUBJECTIVE AND OBJECTIVE BOX
HPI:  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 had initial excellent response to Decitabine but then suddenly progressed to acute leukemia  He was treated on a clinical trial at [facility] [place] with brief good response followed by rapid relapse  Given his history of MDS and comorbid conditions--CAD, obesity, CKD 3, he was not candidate for standard chemotherapy  Venetoclax and Theodora -C have been recommended by Dr Hess at [facility] given recent positive experience with this regimen  Patient decided to have it done here locally after obtaining the Venetoclax from his pharmacy  Patient is on posconazole and so dose of venetoclax is reduced markedly from 600 mg daily to 150 mg daily.  So far he is receiving Venetoclax and Theodora-C as per orders uneventfully but feels weak    PAST MEDICAL & SURGICAL HISTORY:  Diabetes  Leukemia    Medications:  acyclovir   Tablet 400 milliGRAM(s) Oral two times a day  allopurinol 300 milliGRAM(s) Oral daily  ALPRAZolam 0.5 milliGRAM(s) Oral daily  amoxicillin/clavulanate Oral Tab/Cap - Peds 875 milliGRAM(s) Oral two times a day  BACItracin   Ointment 1 Application(s) Topical daily  cytarabine Injectable 46 milliGRAM(s) SubCutaneous daily  dextrose 5% + sodium chloride 0.45% 1000 milliLiter(s) IV Continuous <Continuous>  diphenhydrAMINE   Capsule 50 milliGRAM(s) Oral once PRN Rash and/or Itching  diphenoxylate/atropine 1 Tablet(s) Oral three times a day  latanoprost 0.005% Ophthalmic Solution 1 Drop(s) Both EYES at bedtime  loperamide 2 milliGRAM(s) Oral four times a day PRN Diarrhea  magnesium oxide 400 milliGRAM(s) Oral three times a day with meals  melatonin 3 milliGRAM(s) Oral at bedtime  ondansetron  IVPB 16 milliGRAM(s) IV Intermittent daily  opium Tincture 6 milliGRAM(s) Oral four times a day  posaconazole DR Tablet 100 milliGRAM(s) Oral daily  timolol 0.25% Solution 1 Drop(s) Both EYES daily    Labs:  CBC Full  -  ( 04 Nov 2017 07:55 )  WBC Count : 15.3 K/uL  Hemoglobin : 9.0 g/dL  Hematocrit : 26.4 %  Platelet Count - Automated : 29 K/uL  Mean Cell Volume : 95.1 fl  Mean Cell Hemoglobin : 32.5 pg  Mean Cell Hemoglobin Concentration : 34.1 gm/dL  Auto Neutrophil # : x  Auto Lymphocyte # : x  Auto Monocyte # : x  Auto Eosinophil # : x  Auto Basophil # : x  Auto Neutrophil % : x  Auto Lymphocyte % : x  Auto Monocyte % : x  Auto Eosinophil % : x  Auto Basophil % : x    11-04    144  |  106  |  30<H>  ----------------------------<  113<H>  4.1   |  28  |  1.46<H>    Ca    9.0      04 Nov 2017 07:55  Phos  3.8     11-04  Mg     2.0     11-04    TPro  5.3<L>  /  Alb  2.9<L>  /  TBili  0.8  /  DBili  x   /  AST  37  /  ALT  15  /  AlkPhos  152<H>  11-04      Radiology:             ROS:  Patient comfortable but feels weak  No SOB or chest pain  No palpitation  No abdominal pain, diarrhaea or constipation  No skin changes or swelling of legs    Vital Signs Last 24 Hrs  T(C): 36.8 (04 Nov 2017 09:50), Max: 37.3 (04 Nov 2017 00:32)  T(F): 98.3 (04 Nov 2017 09:50), Max: 99.2 (04 Nov 2017 00:32)  HR: 87 (04 Nov 2017 09:50) (70 - 90)  BP: 146/76 (04 Nov 2017 09:50) (102/56 - 146/76)  BP(mean): --  RR: 20 (04 Nov 2017 09:50) (18 - 20)  SpO2: 95% (04 Nov 2017 09:50) (95% - 97%)    Physical exam:  Patient alert  No distress  CVS: S1, S2 regular or murmur  Chest: bilateral breath sound without rales  Abdomen: obese  No focal neuro deficit  No edema      Assessment and Plan:

## 2017-11-04 NOTE — PROGRESS NOTE ADULT - ASSESSMENT
pt  with  h/o  MDS, now  with  AML, s/ p  mlple  trials  at  [facility], with progression  of  disease  sent  to  [facility]  for  prbc   onc  dr llanos   on acyclovir/ Augmentin  and posaconazole  , daily  for  prophylaxis   pt   is on  an  experimental  oral  agent daily .  and   s/q  Theodora C,  daily  for  5  days  cbc stable today, no transfusions  today    daily  cbc  di,   on iv fluids    pt  with  hypotension in  er,  dc  lopressor  on iv   fluids  PICC line, left upper  arm  dvt  ppx  hemw  will  f/p, spoke  with wife   pt with c/c  diarrhea  for  months

## 2017-11-04 NOTE — PROGRESS NOTE ADULT - ASSESSMENT
Acute myelogenous leukemia with h/o MDS, RAEB after initial response to decitabine  Brief response to clinical trial now Venetoclax and Theodora-C recommended  Venetoclax dose drastically reduced from 600 mg to 150 mg due to posaconazole  pt has own meds  Theodora-C 20 mg/m2 daily x 7  CBC and CMP noted  s/p PRBC, keep hgb greater than 7.5 gm/dl, no PRBC today  Does not need platelets today  Follow tumor lysis labs  continue allopurinol

## 2017-11-05 LAB
BASOPHILS # BLD AUTO: 0 K/UL — SIGNIFICANT CHANGE UP (ref 0–0.2)
BASOPHILS NFR BLD AUTO: 0 % — SIGNIFICANT CHANGE UP (ref 0–2)
EOSINOPHIL # BLD AUTO: 0.08 K/UL — SIGNIFICANT CHANGE UP (ref 0–0.5)
EOSINOPHIL NFR BLD AUTO: 1 % — SIGNIFICANT CHANGE UP (ref 0–6)
GLUCOSE BLDC GLUCOMTR-MCNC: 104 MG/DL — HIGH (ref 70–99)
GLUCOSE BLDC GLUCOMTR-MCNC: 118 MG/DL — HIGH (ref 70–99)
GLUCOSE BLDC GLUCOMTR-MCNC: 122 MG/DL — HIGH (ref 70–99)
GLUCOSE BLDC GLUCOMTR-MCNC: 96 MG/DL — SIGNIFICANT CHANGE UP (ref 70–99)
HCT VFR BLD CALC: 23.6 % — LOW (ref 39–50)
HGB BLD-MCNC: 7.9 G/DL — LOW (ref 13–17)
LDH SERPL L TO P-CCNC: 4080 — SIGNIFICANT CHANGE UP
LYMPHOCYTES # BLD AUTO: 2.18 K/UL — SIGNIFICANT CHANGE UP (ref 1–3.3)
LYMPHOCYTES # BLD AUTO: 29 % — SIGNIFICANT CHANGE UP (ref 13–44)
MCHC RBC-ENTMCNC: 31.9 PG — SIGNIFICANT CHANGE UP (ref 27–34)
MCHC RBC-ENTMCNC: 33.5 GM/DL — SIGNIFICANT CHANGE UP (ref 32–36)
MCV RBC AUTO: 95.2 FL — SIGNIFICANT CHANGE UP (ref 80–100)
MONOCYTES # BLD AUTO: 2.48 K/UL — HIGH (ref 0–0.9)
MONOCYTES NFR BLD AUTO: 33 % — HIGH (ref 2–14)
NEUTROPHILS # BLD AUTO: 0.15 K/UL — LOW (ref 1.8–7.4)
NEUTROPHILS NFR BLD AUTO: 2 % — LOW (ref 43–77)
PLATELET # BLD AUTO: 19 K/UL — CRITICAL LOW (ref 150–400)
RBC # BLD: 2.48 M/UL — LOW (ref 4.2–5.8)
RBC # FLD: 23.5 % — HIGH (ref 10.3–14.5)
URATE SERPL-MCNC: 5.6 MG/DL — SIGNIFICANT CHANGE UP (ref 3.4–8.8)
WBC # BLD: 7.52 K/UL — SIGNIFICANT CHANGE UP (ref 3.8–10.5)
WBC # FLD AUTO: 7.52 K/UL — SIGNIFICANT CHANGE UP (ref 3.8–10.5)

## 2017-11-05 RX ORDER — MORPHINE 10 MG/ML
6 SOLUTION ORAL
Qty: 0 | Refills: 0 | Status: DISCONTINUED | OUTPATIENT
Start: 2017-11-05 | End: 2017-11-07

## 2017-11-05 RX ADMIN — Medication 3 MILLIGRAM(S): at 21:56

## 2017-11-05 RX ADMIN — MAGNESIUM OXIDE 400 MG ORAL TABLET 400 MILLIGRAM(S): 241.3 TABLET ORAL at 12:40

## 2017-11-05 RX ADMIN — Medication 1 TABLET(S): at 21:56

## 2017-11-05 RX ADMIN — MAGNESIUM OXIDE 400 MG ORAL TABLET 400 MILLIGRAM(S): 241.3 TABLET ORAL at 08:46

## 2017-11-05 RX ADMIN — SODIUM CHLORIDE 75 MILLILITER(S): 9 INJECTION, SOLUTION INTRAVENOUS at 21:57

## 2017-11-05 RX ADMIN — Medication 400 MILLIGRAM(S): at 06:14

## 2017-11-05 RX ADMIN — MORPHINE 6 MILLIGRAM(S): 10 SOLUTION ORAL at 17:20

## 2017-11-05 RX ADMIN — LATANOPROST 1 DROP(S): 0.05 SOLUTION/ DROPS OPHTHALMIC; TOPICAL at 21:56

## 2017-11-05 RX ADMIN — Medication 1 APPLICATION(S): at 12:40

## 2017-11-05 RX ADMIN — POSACONAZOLE 100 MILLIGRAM(S): 100 TABLET, DELAYED RELEASE ORAL at 12:40

## 2017-11-05 RX ADMIN — ONDANSETRON 116 MILLIGRAM(S): 8 TABLET, FILM COATED ORAL at 12:40

## 2017-11-05 RX ADMIN — Medication 400 MILLIGRAM(S): at 17:21

## 2017-11-05 RX ADMIN — Medication 875 MILLIGRAM(S): at 17:21

## 2017-11-05 RX ADMIN — Medication 875 MILLIGRAM(S): at 06:14

## 2017-11-05 RX ADMIN — SODIUM CHLORIDE 75 MILLILITER(S): 9 INJECTION, SOLUTION INTRAVENOUS at 06:14

## 2017-11-05 RX ADMIN — Medication 1 DROP(S): at 12:39

## 2017-11-05 RX ADMIN — Medication 300 MILLIGRAM(S): at 12:40

## 2017-11-05 RX ADMIN — MAGNESIUM OXIDE 400 MG ORAL TABLET 400 MILLIGRAM(S): 241.3 TABLET ORAL at 17:21

## 2017-11-05 NOTE — PROGRESS NOTE ADULT - SUBJECTIVE AND OBJECTIVE BOX
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 had initial excellent response to Decitabine but then suddenly progressed to acute leukemia  He was treated on a clinical trial at [facility] [place] with brief good response followed by rapid relapse  Given his history of MDS and comorbid conditions--CAD, obesity, CKD 3, he was not candidate for standard chemotherapy  Venetoclax and Theodora -C have been recommended by Dr Hess at [facility] [place] given recent positive experience with this regimen  Patient decided to have it done here locally after obtaining the Venetoclax from his pharmacy  Patient is on posconazole and so dose of venetoclax is reduced markedly from 600 mg daily to 150 mg daily.  So far he is receiving Venetoclax and Theodora-C as per orders uneventfully but feels weak      PAST MEDICAL & SURGICAL HISTORY:  Diabetes  Leukemia    Medications:  acyclovir   Tablet 400 milliGRAM(s) Oral two times a day  allopurinol 300 milliGRAM(s) Oral daily  ALPRAZolam 0.5 milliGRAM(s) Oral daily  amoxicillin/clavulanate Oral Tab/Cap - Peds 875 milliGRAM(s) Oral two times a day  BACItracin   Ointment 1 Application(s) Topical daily  cytarabine Injectable 46 milliGRAM(s) SubCutaneous daily  dextrose 5% + sodium chloride 0.45% 1000 milliLiter(s) IV Continuous <Continuous>  diphenhydrAMINE   Capsule 50 milliGRAM(s) Oral once PRN Rash and/or Itching  diphenoxylate/atropine 1 Tablet(s) Oral three times a day  latanoprost 0.005% Ophthalmic Solution 1 Drop(s) Both EYES at bedtime  loperamide 2 milliGRAM(s) Oral four times a day PRN Diarrhea  magnesium oxide 400 milliGRAM(s) Oral three times a day with meals  melatonin 3 milliGRAM(s) Oral at bedtime  ondansetron  IVPB 16 milliGRAM(s) IV Intermittent daily  opium Tincture 6 milliGRAM(s) Oral two times a day  posaconazole DR Tablet 100 milliGRAM(s) Oral daily  timolol 0.25% Solution 1 Drop(s) Both EYES daily    Labs:  CBC Full  -  ( 05 Nov 2017 08:07 )  WBC Count : 7.52 K/uL  Hemoglobin : 7.9 g/dL  Hematocrit : 23.6 %  Platelet Count - Automated : 19 K/uL  Mean Cell Volume : 95.2 fl  Mean Cell Hemoglobin : 31.9 pg  Mean Cell Hemoglobin Concentration : 33.5 gm/dL  Auto Neutrophil # : 0.15 K/uL  Auto Lymphocyte # : 2.18 K/uL  Auto Monocyte # : 2.48 K/uL  Auto Eosinophil # : 0.08 K/uL  Auto Basophil # : 0.00 K/uL  Auto Neutrophil % : 2.0 %  Auto Lymphocyte % : 29.0 %  Auto Monocyte % : 33.0 %  Auto Eosinophil % : 1.0 %  Auto Basophil % : 0.0 %    Manual Differential (11.05.17 @ 08:07)    Poikilocytosis: Slight    Hypochromia: Slight    Macrocytosis: Slight    Anisocytosis: Moderate    Elliptocytes: Slight    Red Cell Morphology: Abnormal    Platelet Morphology: Normal    Manual Smear Verification: Performed    Myelocytes %: 1.0 %    Promyelocytes %: 8.0 %    Blasts %: 26.0 %  11-04    144  |  106  |  30<H>  ----------------------------<  113<H>  4.1   |  28  |  1.46<H>    Ca    9.0      04 Nov 2017 07:55  Phos  3.8     11-04  Mg     2.0     11-04    TPro  5.3<L>  /  Alb  2.9<L>  /  TBili  0.8  /  DBili  x   /  AST  37  /  ALT  15  /  AlkPhos  152<H>  11-04    CMP for today pending along with UA and LDH      Radiology:             ROS:  Patient comfortable without distress  No SOB or chest pain  No palpitation  No abdominal pain, diarrhaea or constipation  No weakness of extremities  No skin changes or swelling of legs    Vital Signs Last 24 Hrs  T(C): 36.6 (05 Nov 2017 09:30), Max: 37.5 (04 Nov 2017 21:18)  T(F): 97.8 (05 Nov 2017 09:30), Max: 99.5 (04 Nov 2017 21:18)  HR: 87 (05 Nov 2017 09:30) (70 - 95)  BP: 116/65 (05 Nov 2017 09:30) (101/65 - 128/68)  BP(mean): --  RR: 20 (05 Nov 2017 09:30) (18 - 20)  SpO2: 97% (05 Nov 2017 09:30) (96% - 99%)    Physical exam:  Patient alert and oriented  No distress  CVS: S1, S2 regular or murmur  Chest: bilateral breath sound without rales  Abdomen: soft, not tender,obese no organomegaly or masses  No focal neuro deficit  No edema      Assessment and Plan:

## 2017-11-05 NOTE — PROGRESS NOTE ADULT - ASSESSMENT
Acute myelogenous leukemia with h/o MDS, RAEB after initial response to decitabine  Brief response to clinical trial now Venetoclax and Theodora-C recommended  Venetoclax dose drastically reduced from 600 mg to 150 mg due to posaconazole  pt has own meds  Theodora-C 20 mg/m2 daily x 7  CBC and CMP noted  s/p PRBC, Hb decreasing, will likely need PRBC again on 11/6/17, keep hgb greater than 7.5 gm/dl, no PRBC today  Does not need platelets today, but may need on 11/6/17  Continue following tumor lysis labs  continue allopurinol

## 2017-11-05 NOTE — PROGRESS NOTE ADULT - SUBJECTIVE AND OBJECTIVE BOX
resting  MEDICATIONS  (STANDING):  acyclovir   Tablet 400 milliGRAM(s) Oral two times a day  allopurinol 300 milliGRAM(s) Oral daily  ALPRAZolam 0.5 milliGRAM(s) Oral daily  amoxicillin/clavulanate Oral Tab/Cap - Peds 875 milliGRAM(s) Oral two times a day  BACItracin   Ointment 1 Application(s) Topical daily  cytarabine Injectable 46 milliGRAM(s) SubCutaneous daily  dextrose 5% + sodium chloride 0.45% 1000 milliLiter(s) (75 mL/Hr) IV Continuous <Continuous>  diphenoxylate/atropine 1 Tablet(s) Oral three times a day  latanoprost 0.005% Ophthalmic Solution 1 Drop(s) Both EYES at bedtime  magnesium oxide 400 milliGRAM(s) Oral three times a day with meals  melatonin 3 milliGRAM(s) Oral at bedtime  ondansetron  IVPB 16 milliGRAM(s) IV Intermittent daily  opium Tincture 6 milliGRAM(s) Oral four times a day  posaconazole DR Tablet 100 milliGRAM(s) Oral daily  timolol 0.25% Solution 1 Drop(s) Both EYES daily    MEDICATIONS  (PRN):  diphenhydrAMINE   Capsule 50 milliGRAM(s) Oral once PRN Rash and/or Itching  loperamide 2 milliGRAM(s) Oral four times a day PRN Diarrhea      Vital Signs Last 24 Hrs  T(C): 36.8 (05 Nov 2017 04:33), Max: 37.5 (04 Nov 2017 21:18)  T(F): 98.3 (05 Nov 2017 04:33), Max: 99.5 (04 Nov 2017 21:18)  HR: 76 (05 Nov 2017 04:33) (70 - 95)  BP: 128/68 (05 Nov 2017 04:33) (101/65 - 146/76)  BP(mean): --  RR: 18 (05 Nov 2017 04:33) (18 - 20)  SpO2: 97% (05 Nov 2017 04:33) (95% - 99%)  CAPILLARY BLOOD GLUCOSE      POCT Blood Glucose.: 110 mg/dL (04 Nov 2017 21:16)  POCT Blood Glucose.: 114 mg/dL (04 Nov 2017 16:11)  POCT Blood Glucose.: 114 mg/dL (04 Nov 2017 11:53)  POCT Blood Glucose.: 113 mg/dL (04 Nov 2017 07:31)    I&O's Summary    03 Nov 2017 07:01  -  04 Nov 2017 07:00  --------------------------------------------------------  IN: 2938 mL / OUT: 1625 mL / NET: 1313 mL    04 Nov 2017 08:01  -  05 Nov 2017 06:22  --------------------------------------------------------  IN: 1367 mL / OUT: 1150 mL / NET: 217 mL        PHYSICAL EXAM:  HEAD:  Atraumatic, Normocephalic  NECK: Supple, No JVD  CHEST/LUNG: Clear to auscultation bilaterally; No wheeze  HEART: Regular rate and rhythm; No murmurs, rubs, or gallops  ABDOMEN: Soft, Nontender, ; Bowel sounds   EXTREMITIES:    no  edema  NEUROLOGY:  alert    LABS:                        9.0    15.3  )-----------( 29       ( 04 Nov 2017 07:55 )             26.4     11-04    144  |  106  |  30<H>  ----------------------------<  113<H>  4.1   |  28  |  1.46<H>    Ca    9.0      04 Nov 2017 07:55  Phos  3.8     11-04  Mg     2.0     11-04    TPro  5.3<L>  /  Alb  2.9<L>  /  TBili  0.8  /  DBili  x   /  AST  37  /  ALT  15  /  AlkPhos  152<H>  11-04 11-03 @ 13:05  2.7  31          Consultant(s) Notes Reviewed:      Care Discussed with Consultants/Other Providers:

## 2017-11-05 NOTE — PHYSICAL THERAPY INITIAL EVALUATION ADULT - PERTINENT HX OF CURRENT PROBLEM, REHAB EVAL
67 year old, leukemia / AML,  progressed  from , MDS, now presents with  anemia h/o  dm, admitted from [facility], Results shows decreased h/h,

## 2017-11-05 NOTE — PHYSICAL THERAPY INITIAL EVALUATION ADULT - DISCHARGE DISPOSITION, PT EVAL
Pt declined both rehab and home PT, stated" i'll be alright I don't need therapy"/rehabilitation facility

## 2017-11-05 NOTE — PHYSICAL THERAPY INITIAL EVALUATION ADULT - ADDITIONAL COMMENTS
Lives with spouse in a pvt house +stairs, use RW for functional ambulation needs some assistance for ADLs by spouse or HHA,

## 2017-11-06 LAB
ALBUMIN SERPL ELPH-MCNC: 2.4 G/DL — LOW (ref 3.3–5)
ALP SERPL-CCNC: 136 U/L — HIGH (ref 40–120)
ALT FLD-CCNC: 13 U/L — SIGNIFICANT CHANGE UP (ref 10–45)
ANION GAP SERPL CALC-SCNC: 13 MMOL/L — SIGNIFICANT CHANGE UP (ref 5–17)
AST SERPL-CCNC: 26 U/L — SIGNIFICANT CHANGE UP (ref 10–40)
BASOPHILS # BLD AUTO: 0 K/UL — SIGNIFICANT CHANGE UP (ref 0–0.2)
BASOPHILS NFR BLD AUTO: 0 % — SIGNIFICANT CHANGE UP (ref 0–2)
BILIRUB SERPL-MCNC: 0.7 MG/DL — SIGNIFICANT CHANGE UP (ref 0.2–1.2)
BUN SERPL-MCNC: 27 MG/DL — HIGH (ref 7–23)
CALCIUM SERPL-MCNC: 9.3 MG/DL — SIGNIFICANT CHANGE UP (ref 8.4–10.5)
CHLORIDE SERPL-SCNC: 107 MMOL/L — SIGNIFICANT CHANGE UP (ref 96–108)
CO2 SERPL-SCNC: 24 MMOL/L — SIGNIFICANT CHANGE UP (ref 22–31)
CREAT SERPL-MCNC: 1.39 MG/DL — HIGH (ref 0.5–1.3)
EOSINOPHIL # BLD AUTO: 0 K/UL — SIGNIFICANT CHANGE UP (ref 0–0.5)
EOSINOPHIL NFR BLD AUTO: 0 % — SIGNIFICANT CHANGE UP (ref 0–6)
GLUCOSE BLDC GLUCOMTR-MCNC: 101 MG/DL — HIGH (ref 70–99)
GLUCOSE BLDC GLUCOMTR-MCNC: 109 MG/DL — HIGH (ref 70–99)
GLUCOSE BLDC GLUCOMTR-MCNC: 121 MG/DL — HIGH (ref 70–99)
GLUCOSE SERPL-MCNC: 94 MG/DL — SIGNIFICANT CHANGE UP (ref 70–99)
HCT VFR BLD CALC: 25 % — LOW (ref 39–50)
HGB BLD-MCNC: 7.9 G/DL — LOW (ref 13–17)
LDH SERPL L TO P-CCNC: 3020 U/L — HIGH (ref 50–242)
LYMPHOCYTES # BLD AUTO: 29.5 % — SIGNIFICANT CHANGE UP (ref 13–44)
LYMPHOCYTES # BLD AUTO: 3.22 K/UL — SIGNIFICANT CHANGE UP (ref 1–3.3)
MCHC RBC-ENTMCNC: 30.7 PG — SIGNIFICANT CHANGE UP (ref 27–34)
MCHC RBC-ENTMCNC: 31.6 GM/DL — LOW (ref 32–36)
MCV RBC AUTO: 97.3 FL — SIGNIFICANT CHANGE UP (ref 80–100)
MONOCYTES # BLD AUTO: 1.87 K/UL — HIGH (ref 0–0.9)
MONOCYTES NFR BLD AUTO: 17.1 % — HIGH (ref 2–14)
NEUTROPHILS # BLD AUTO: 0.41 K/UL — LOW (ref 1.8–7.4)
NEUTROPHILS NFR BLD AUTO: 3.8 % — LOW (ref 43–77)
PHOSPHATE SERPL-MCNC: 4.1 MG/DL — SIGNIFICANT CHANGE UP (ref 2.5–4.5)
PLATELET # BLD AUTO: 19 K/UL — CRITICAL LOW (ref 150–400)
POTASSIUM SERPL-MCNC: 4.4 MMOL/L — SIGNIFICANT CHANGE UP (ref 3.5–5.3)
POTASSIUM SERPL-SCNC: 4.4 MMOL/L — SIGNIFICANT CHANGE UP (ref 3.5–5.3)
PROT SERPL-MCNC: 4.9 G/DL — LOW (ref 6–8.3)
RBC # BLD: 2.57 M/UL — LOW (ref 4.2–5.8)
RBC # FLD: 23 % — HIGH (ref 10.3–14.5)
SODIUM SERPL-SCNC: 144 MMOL/L — SIGNIFICANT CHANGE UP (ref 135–145)
WBC # BLD: 10.91 K/UL — HIGH (ref 3.8–10.5)
WBC # FLD AUTO: 10.91 K/UL — HIGH (ref 3.8–10.5)

## 2017-11-06 RX ADMIN — MAGNESIUM OXIDE 400 MG ORAL TABLET 400 MILLIGRAM(S): 241.3 TABLET ORAL at 08:05

## 2017-11-06 RX ADMIN — Medication 0.5 MILLIGRAM(S): at 12:26

## 2017-11-06 RX ADMIN — Medication 1 APPLICATION(S): at 12:26

## 2017-11-06 RX ADMIN — Medication 400 MILLIGRAM(S): at 18:27

## 2017-11-06 RX ADMIN — MAGNESIUM OXIDE 400 MG ORAL TABLET 400 MILLIGRAM(S): 241.3 TABLET ORAL at 18:27

## 2017-11-06 RX ADMIN — MORPHINE 6 MILLIGRAM(S): 10 SOLUTION ORAL at 19:28

## 2017-11-06 RX ADMIN — LATANOPROST 1 DROP(S): 0.05 SOLUTION/ DROPS OPHTHALMIC; TOPICAL at 21:01

## 2017-11-06 RX ADMIN — POSACONAZOLE 100 MILLIGRAM(S): 100 TABLET, DELAYED RELEASE ORAL at 12:26

## 2017-11-06 RX ADMIN — Medication 300 MILLIGRAM(S): at 12:26

## 2017-11-06 RX ADMIN — Medication 3 MILLIGRAM(S): at 21:00

## 2017-11-06 RX ADMIN — Medication 1 TABLET(S): at 21:00

## 2017-11-06 RX ADMIN — MAGNESIUM OXIDE 400 MG ORAL TABLET 400 MILLIGRAM(S): 241.3 TABLET ORAL at 12:26

## 2017-11-06 RX ADMIN — Medication 1 TABLET(S): at 06:39

## 2017-11-06 RX ADMIN — MORPHINE 6 MILLIGRAM(S): 10 SOLUTION ORAL at 06:40

## 2017-11-06 RX ADMIN — Medication 400 MILLIGRAM(S): at 06:39

## 2017-11-06 RX ADMIN — Medication 875 MILLIGRAM(S): at 06:39

## 2017-11-06 RX ADMIN — SODIUM CHLORIDE 75 MILLILITER(S): 9 INJECTION, SOLUTION INTRAVENOUS at 19:29

## 2017-11-06 RX ADMIN — Medication 875 MILLIGRAM(S): at 18:27

## 2017-11-06 RX ADMIN — Medication 1 TABLET(S): at 14:38

## 2017-11-06 RX ADMIN — Medication 1 DROP(S): at 12:26

## 2017-11-06 NOTE — PROGRESS NOTE ADULT - SUBJECTIVE AND OBJECTIVE BOX
less  diarhhea    MEDICATIONS  (STANDING):  acyclovir   Tablet 400 milliGRAM(s) Oral two times a day  allopurinol 300 milliGRAM(s) Oral daily  ALPRAZolam 0.5 milliGRAM(s) Oral daily  amoxicillin/clavulanate Oral Tab/Cap - Peds 875 milliGRAM(s) Oral two times a day  BACItracin   Ointment 1 Application(s) Topical daily  cytarabine Injectable 46 milliGRAM(s) SubCutaneous daily  dextrose 5% + sodium chloride 0.45% 1000 milliLiter(s) (75 mL/Hr) IV Continuous <Continuous>  diphenoxylate/atropine 1 Tablet(s) Oral three times a day  latanoprost 0.005% Ophthalmic Solution 1 Drop(s) Both EYES at bedtime  magnesium oxide 400 milliGRAM(s) Oral three times a day with meals  melatonin 3 milliGRAM(s) Oral at bedtime  opium Tincture 6 milliGRAM(s) Oral two times a day  posaconazole DR Tablet 100 milliGRAM(s) Oral daily  timolol 0.25% Solution 1 Drop(s) Both EYES daily    MEDICATIONS  (PRN):  diphenhydrAMINE   Capsule 50 milliGRAM(s) Oral once PRN Rash and/or Itching  loperamide 2 milliGRAM(s) Oral four times a day PRN Diarrhea      Vital Signs Last 24 Hrs  T(C): 36.7 (06 Nov 2017 05:35), Max: 37.2 (06 Nov 2017 00:09)  T(F): 98 (06 Nov 2017 05:35), Max: 98.9 (06 Nov 2017 00:09)  HR: 80 (06 Nov 2017 06:27) (71 - 93)  BP: 120/72 (06 Nov 2017 05:35) (102/65 - 120/72)  BP(mean): --  RR: 18 (06 Nov 2017 05:35) (17 - 20)  SpO2: 97% (06 Nov 2017 06:27) (96% - 98%)  CAPILLARY BLOOD GLUCOSE      POCT Blood Glucose.: 101 mg/dL (06 Nov 2017 07:43)  POCT Blood Glucose.: 118 mg/dL (05 Nov 2017 21:13)  POCT Blood Glucose.: 122 mg/dL (05 Nov 2017 16:18)  POCT Blood Glucose.: 104 mg/dL (05 Nov 2017 11:20)    I&O's Summary    05 Nov 2017 07:01  -  06 Nov 2017 07:00  --------------------------------------------------------  IN: 2497 mL / OUT: 2000 mL / NET: 497 mL    06 Nov 2017 07:01  -  06 Nov 2017 08:23  --------------------------------------------------------  IN: 0 mL / OUT: 100 mL / NET: -100 mL        PHYSICAL EXAM:  HEAD:  Atraumatic, Normocephalic  NECK: Supple, No JVD  CHEST/LUNG: Clear to auscultation bilaterally; No wheeze  HEART: Regular rate and rhythm; No murmurs, rubs, or gallops  ABDOMEN: Soft, Nontender, ; Bowel sounds   EXTREMITIES:    no  edema  NEUROLOGY:  alert    LABS:                        7.9    7.52  )-----------( 19       ( 05 Nov 2017 08:07 )             23.6                               Consultant(s) Notes Reviewed:      Care Discussed with Consultants/Other Providers:

## 2017-11-06 NOTE — PROGRESS NOTE ADULT - SUBJECTIVE AND OBJECTIVE BOX
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 had initial excellent response to Decitabine but then suddenly progressed to acute leukemia  He was treated on a clinical trial at [facility] [place] with brief good response followed by rapid relapse  Given his history of MDS and comorbid conditions--CAD, obesity, CKD 3, he was not candidate for standard chemotherapy  Venetoclax and Theodora -C have been recommended by Dr Hess at [facility] [place] given recent positive experience with this regimen  Patient decided to have it done here locally after obtaining the Venetoclax from his pharmacy  Patient is on posconazole and so dose of venetoclax is reduced markedly from 600 mg daily to 150 mg daily.  So far he is receiving Venetoclax and Theodora-C as per orders uneventfully.   No complain today        PAST MEDICAL & SURGICAL HISTORY:  Diabetes  Leukemia    Medications:  acyclovir   Tablet 400 milliGRAM(s) Oral two times a day  allopurinol 300 milliGRAM(s) Oral daily  ALPRAZolam 0.5 milliGRAM(s) Oral daily  amoxicillin/clavulanate Oral Tab/Cap - Peds 875 milliGRAM(s) Oral two times a day  BACItracin   Ointment 1 Application(s) Topical daily  cytarabine Injectable 46 milliGRAM(s) SubCutaneous daily  dextrose 5% + sodium chloride 0.45% 1000 milliLiter(s) IV Continuous <Continuous>  diphenhydrAMINE   Capsule 50 milliGRAM(s) Oral once PRN Rash and/or Itching  diphenoxylate/atropine 1 Tablet(s) Oral three times a day  latanoprost 0.005% Ophthalmic Solution 1 Drop(s) Both EYES at bedtime  loperamide 2 milliGRAM(s) Oral four times a day PRN Diarrhea  magnesium oxide 400 milliGRAM(s) Oral three times a day with meals  melatonin 3 milliGRAM(s) Oral at bedtime  opium Tincture 6 milliGRAM(s) Oral two times a day  posaconazole DR Tablet 100 milliGRAM(s) Oral daily  timolol 0.25% Solution 1 Drop(s) Both EYES daily    Labs:  CBC Full  -  ( 05 Nov 2017 08:07 )  WBC Count : 7.52 K/uL  Hemoglobin : 7.9 g/dL  Hematocrit : 23.6 %  Platelet Count - Automated : 19 K/uL  Mean Cell Volume : 95.2 fl  Mean Cell Hemoglobin : 31.9 pg  Mean Cell Hemoglobin Concentration : 33.5 gm/dL  Auto Neutrophil # : 0.15 K/uL  Auto Lymphocyte # : 2.18 K/uL  Auto Monocyte # : 2.48 K/uL  Auto Eosinophil # : 0.08 K/uL  Auto Basophil # : 0.00 K/uL  Auto Neutrophil % : 2.0 %  Auto Lymphocyte % : 29.0 %  Auto Monocyte % : 33.0 %  Auto Eosinophil % : 1.0 %  Auto Basophil % : 0.0 %    11-06    144  |  107  |  27<H>  ----------------------------<  94  4.4   |  24  |  1.39<H>    Ca    9.3      06 Nov 2017 08:26  Phos  4.1     11-06    TPro  4.9<L>  /  Alb  2.4<L>  /  TBili  0.7  /  DBili  x   /  AST  26  /  ALT  13  /  AlkPhos  136<H>  11-06      Radiology:             ROS:  Patient comfortable without distress  No SOB or chest pain  No palpitation  No abdominal pain, diarrhaea or constipation  No weakness of extremities  No skin changes or swelling of legs    Vital Signs Last 24 Hrs  T(C): 36.4 (06 Nov 2017 08:59), Max: 37.2 (06 Nov 2017 00:09)  T(F): 97.6 (06 Nov 2017 08:59), Max: 98.9 (06 Nov 2017 00:09)  HR: 85 (06 Nov 2017 08:59) (71 - 93)  BP: 115/70 (06 Nov 2017 08:59) (102/65 - 120/72)  BP(mean): --  RR: 20 (06 Nov 2017 08:59) (17 - 20)  SpO2: 98% (06 Nov 2017 08:59) (96% - 98%)    Physical exam:  Patient alert and oriented  No distress  CVS: S1, S2 regular or murmur  Chest: bilateral breath sound without rales  Abdomen: obese, soft, not tender, no organomegaly or masses  No focal neuro deficit  No edema  PICC line present    Assessment and Plan:

## 2017-11-06 NOTE — PROGRESS NOTE ADULT - ASSESSMENT
Acute myelogenous leukemia with h/o MDS, RAEB after initial response to decitabine  Brief response to clinical trial now Venetoclax and Theodora-C recommended  Venetoclax dose drastically reduced from 600 mg to 150 mg due to posaconazole  pt has own meds and is receiving it  Theodora-C 20 mg/m2 daily x 7, day 4 today  CMP noted, CBC pending  s/p PRBC 3 days back,  keep hgb greater than 7.5 gm/dl, as needed by PRBC  Platelet transfusion as needed for platelets <10-15k/ul or bleeding  Continue following tumor lysis labs  continue allopurinol

## 2017-11-06 NOTE — PROGRESS NOTE ADULT - ASSESSMENT
pt  with  h/o  MDS, now  with  AML, s/ p  mlple  trials  at  [facility], with progression  of  disease  sent  to  [facility]  for  prbc   onc  dr llanos   on acyclovir/ Augmentin  and posaconazole  , daily  for  prophylaxis   pt   is on  an   oral  agent daily/ venetodax,  .  and   s/q  Theodora C,  daily    for  7  days    daily  cbc  di,   on iv fluids    pt  with  hypotension in  er,  dc  lopressor  on iv   fluids  PICC line, left upper  arm  dvt  ppx   pt with c/c  diarrhea  for  months,  resolving  labs, pending today

## 2017-11-07 LAB
ANION GAP SERPL CALC-SCNC: 14 MMOL/L — SIGNIFICANT CHANGE UP (ref 5–17)
BASOPHILS # BLD AUTO: 0 K/UL — SIGNIFICANT CHANGE UP (ref 0–0.2)
BASOPHILS NFR BLD AUTO: 0 % — SIGNIFICANT CHANGE UP (ref 0–2)
BUN SERPL-MCNC: 24 MG/DL — HIGH (ref 7–23)
CALCIUM SERPL-MCNC: 8.9 MG/DL — SIGNIFICANT CHANGE UP (ref 8.4–10.5)
CHLORIDE SERPL-SCNC: 104 MMOL/L — SIGNIFICANT CHANGE UP (ref 96–108)
CO2 SERPL-SCNC: 25 MMOL/L — SIGNIFICANT CHANGE UP (ref 22–31)
CREAT SERPL-MCNC: 1.37 MG/DL — HIGH (ref 0.5–1.3)
EOSINOPHIL # BLD AUTO: 0 K/UL — SIGNIFICANT CHANGE UP (ref 0–0.5)
EOSINOPHIL NFR BLD AUTO: 0 % — SIGNIFICANT CHANGE UP (ref 0–6)
GLUCOSE BLDC GLUCOMTR-MCNC: 106 MG/DL — HIGH (ref 70–99)
GLUCOSE BLDC GLUCOMTR-MCNC: 117 MG/DL — HIGH (ref 70–99)
GLUCOSE BLDC GLUCOMTR-MCNC: 92 MG/DL — SIGNIFICANT CHANGE UP (ref 70–99)
GLUCOSE BLDC GLUCOMTR-MCNC: 97 MG/DL — SIGNIFICANT CHANGE UP (ref 70–99)
GLUCOSE SERPL-MCNC: 91 MG/DL — SIGNIFICANT CHANGE UP (ref 70–99)
HCT VFR BLD CALC: 24.4 % — LOW (ref 39–50)
HGB BLD-MCNC: 8.2 G/DL — LOW (ref 13–17)
LDH SERPL L TO P-CCNC: 2440 U/L — HIGH (ref 50–242)
LYMPHOCYTES # BLD AUTO: 36 % — SIGNIFICANT CHANGE UP (ref 13–44)
LYMPHOCYTES # BLD AUTO: 4.7 K/UL — HIGH (ref 1–3.3)
MCHC RBC-ENTMCNC: 32 PG — SIGNIFICANT CHANGE UP (ref 27–34)
MCHC RBC-ENTMCNC: 33.6 GM/DL — SIGNIFICANT CHANGE UP (ref 32–36)
MCV RBC AUTO: 95.3 FL — SIGNIFICANT CHANGE UP (ref 80–100)
MONOCYTES # BLD AUTO: 2.22 K/UL — HIGH (ref 0–0.9)
MONOCYTES NFR BLD AUTO: 17 % — HIGH (ref 2–14)
NEUTROPHILS # BLD AUTO: 0 K/UL — LOW (ref 1.8–7.4)
NEUTROPHILS NFR BLD AUTO: 0 % — LOW (ref 43–77)
PLATELET # BLD AUTO: 18 K/UL — CRITICAL LOW (ref 150–400)
POTASSIUM SERPL-MCNC: 4.2 MMOL/L — SIGNIFICANT CHANGE UP (ref 3.5–5.3)
POTASSIUM SERPL-SCNC: 4.2 MMOL/L — SIGNIFICANT CHANGE UP (ref 3.5–5.3)
RBC # BLD: 2.56 M/UL — LOW (ref 4.2–5.8)
RBC # FLD: 22.5 % — HIGH (ref 10.3–14.5)
SODIUM SERPL-SCNC: 143 MMOL/L — SIGNIFICANT CHANGE UP (ref 135–145)
WBC # BLD: 13.06 K/UL — HIGH (ref 3.8–10.5)
WBC # FLD AUTO: 13.06 K/UL — HIGH (ref 3.8–10.5)

## 2017-11-07 RX ADMIN — MAGNESIUM OXIDE 400 MG ORAL TABLET 400 MILLIGRAM(S): 241.3 TABLET ORAL at 11:11

## 2017-11-07 RX ADMIN — MAGNESIUM OXIDE 400 MG ORAL TABLET 400 MILLIGRAM(S): 241.3 TABLET ORAL at 13:13

## 2017-11-07 RX ADMIN — Medication 400 MILLIGRAM(S): at 05:47

## 2017-11-07 RX ADMIN — Medication 875 MILLIGRAM(S): at 05:47

## 2017-11-07 RX ADMIN — SODIUM CHLORIDE 75 MILLILITER(S): 9 INJECTION, SOLUTION INTRAVENOUS at 21:03

## 2017-11-07 RX ADMIN — Medication 400 MILLIGRAM(S): at 17:55

## 2017-11-07 RX ADMIN — MORPHINE 6 MILLIGRAM(S): 10 SOLUTION ORAL at 05:46

## 2017-11-07 RX ADMIN — Medication 1 APPLICATION(S): at 13:13

## 2017-11-07 RX ADMIN — Medication 300 MILLIGRAM(S): at 13:13

## 2017-11-07 RX ADMIN — Medication 1 DROP(S): at 13:13

## 2017-11-07 RX ADMIN — MAGNESIUM OXIDE 400 MG ORAL TABLET 400 MILLIGRAM(S): 241.3 TABLET ORAL at 17:55

## 2017-11-07 RX ADMIN — Medication 1 TABLET(S): at 05:46

## 2017-11-07 RX ADMIN — Medication 0.5 MILLIGRAM(S): at 13:12

## 2017-11-07 RX ADMIN — Medication 3 MILLIGRAM(S): at 21:04

## 2017-11-07 RX ADMIN — POSACONAZOLE 100 MILLIGRAM(S): 100 TABLET, DELAYED RELEASE ORAL at 13:12

## 2017-11-07 RX ADMIN — Medication 875 MILLIGRAM(S): at 17:55

## 2017-11-07 RX ADMIN — LATANOPROST 1 DROP(S): 0.05 SOLUTION/ DROPS OPHTHALMIC; TOPICAL at 21:04

## 2017-11-07 NOTE — PROGRESS NOTE ADULT - ASSESSMENT
pt  with  h/o  MDS, now  with  AML, s/ p  mlple  trials  at  [facility], with progression  of  disease  sent  to  [facility]  for  prbc   onc  dr llanos   on acyclovir/ Augmentin  and posaconazole  , daily  for  prophylaxis   pt   is on  an   oral  agent daily/ venetodax,  .  and   s/q  Theodora C,  daily    for  7  days    daily  cbc  di,   on iv fluids    pt  with  hypotension in  er,  dc  lopressor  on iv   fluids  PICC line, left upper  arm  dvt  ppx   pt with c/c  diarrhea  for  months,  resolving  labs, pending today  prbc/ platelets, as needed

## 2017-11-07 NOTE — PROGRESS NOTE ADULT - SUBJECTIVE AND OBJECTIVE BOX
MEDICATIONS  (STANDING):  acyclovir   Tablet 400 milliGRAM(s) Oral two times a day  allopurinol 300 milliGRAM(s) Oral daily  ALPRAZolam 0.5 milliGRAM(s) Oral daily  amoxicillin/clavulanate Oral Tab/Cap - Peds 875 milliGRAM(s) Oral two times a day  BACItracin   Ointment 1 Application(s) Topical daily  cytarabine Injectable 46 milliGRAM(s) SubCutaneous daily  dextrose 5% + sodium chloride 0.45% 1000 milliLiter(s) (75 mL/Hr) IV Continuous <Continuous>  diphenoxylate/atropine 1 Tablet(s) Oral three times a day  latanoprost 0.005% Ophthalmic Solution 1 Drop(s) Both EYES at bedtime  magnesium oxide 400 milliGRAM(s) Oral three times a day with meals  melatonin 3 milliGRAM(s) Oral at bedtime  opium Tincture 6 milliGRAM(s) Oral two times a day  posaconazole DR Tablet 100 milliGRAM(s) Oral daily  timolol 0.25% Solution 1 Drop(s) Both EYES daily    MEDICATIONS  (PRN):  diphenhydrAMINE   Capsule 50 milliGRAM(s) Oral once PRN Rash and/or Itching  loperamide 2 milliGRAM(s) Oral four times a day PRN Diarrhea      Vital Signs Last 24 Hrs  T(C): 36.3 (07 Nov 2017 05:45), Max: 37.6 (07 Nov 2017 00:52)  T(F): 97.4 (07 Nov 2017 05:45), Max: 99.7 (07 Nov 2017 00:52)  HR: 83 (07 Nov 2017 05:45) (80 - 95)  BP: 144/77 (07 Nov 2017 05:45) (101/60 - 144/77)  BP(mean): --  RR: 20 (07 Nov 2017 05:45) (18 - 20)  SpO2: 98% (07 Nov 2017 05:45) (95% - 99%)  CAPILLARY BLOOD GLUCOSE      POCT Blood Glucose.: 92 mg/dL (07 Nov 2017 07:24)  POCT Blood Glucose.: 121 mg/dL (06 Nov 2017 20:59)  POCT Blood Glucose.: 109 mg/dL (06 Nov 2017 11:27)    I&O's Summary    06 Nov 2017 07:01  -  07 Nov 2017 07:00  --------------------------------------------------------  IN: 3114 mL / OUT: 2550 mL / NET: 564 mL        PHYSICAL EXAM:  HEAD:  Atraumatic, Normocephalic  NECK: Supple, No JVD  CHEST/LUNG: Clear to auscultation bilaterally; No wheeze  HEART: Regular rate and rhythm; No murmurs, rubs, or gallops  ABDOMEN: Soft, Nontender, ; Bowel sounds   EXTREMITIES:    no  edema  NEUROLOGY:  alert    LABS:                        7.9    10.91 )-----------( 19       ( 06 Nov 2017 08:32 )             25.0     11-06    144  |  107  |  27<H>  ----------------------------<  94  4.4   |  24  |  1.39<H>    Ca    9.3      06 Nov 2017 08:26  Phos  4.1     11-06    TPro  4.9<L>  /  Alb  2.4<L>  /  TBili  0.7  /  DBili  x   /  AST  26  /  ALT  13  /  AlkPhos  136<H>  11-06                        Consultant(s) Notes Reviewed:      Care Discussed with Consultants/Other Providers:

## 2017-11-07 NOTE — PROGRESS NOTE ADULT - ASSESSMENT
Patient with AML with h/o MDS, RAEB after initial response to decitabine  Brief response to clinical trial now on Venetoclax and Theodora-C as recommended  Venetoclax dose was drastically reduced from 600 mg to 150 mg due to posaconazole  Patient is receiving his own meds  Theodora-C 20 mg/m2 daily x 7, day 5 today  CBC pending. CMP noted  s/p PRBC 4 days back,  keep hgb greater than 7.5 gm/dl, as needed by PRBC  Platelet transfusion as needed for platelets <10-15k/ul or bleeding  Continue following tumor lysis labs, LDH has decreased in last few days, uric acid OK  continue allopurinol

## 2017-11-07 NOTE — PROGRESS NOTE ADULT - SUBJECTIVE AND OBJECTIVE BOX
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 had initial excellent response to Decitabine but then suddenly progressed to acute leukemia  He was treated on a clinical [place] [facility] [place] brief good response followed by rapid relapse  Given his history of MDS and comorbid conditions--CAD, obesity, CKD 3, he was not candidate for standard chemotherapy  Venetoclax and Theodora -C have been recommended by Dr Hess at [facility] given recent positive experience with this regimen  Patient decided to have it done here locally after obtaining the Venetoclax from his pharmacy  Patient is on posconazole and so dose of venetoclax is reduced markedly from 600 mg daily to 150 mg daily.  So far he is receiving Venetoclax and Theodora-C as per orders uneventfully.   No complain today, but anxious to know his CBC          PAST MEDICAL & SURGICAL HISTORY:  Diabetes  Leukemia    Medications:  acyclovir   Tablet 400 milliGRAM(s) Oral two times a day  allopurinol 300 milliGRAM(s) Oral daily  ALPRAZolam 0.5 milliGRAM(s) Oral daily  amoxicillin/clavulanate Oral Tab/Cap - Peds 875 milliGRAM(s) Oral two times a day  BACItracin   Ointment 1 Application(s) Topical daily  cytarabine Injectable 46 milliGRAM(s) SubCutaneous daily  dextrose 5% + sodium chloride 0.45% 1000 milliLiter(s) IV Continuous <Continuous>  diphenhydrAMINE   Capsule 50 milliGRAM(s) Oral once PRN Rash and/or Itching  latanoprost 0.005% Ophthalmic Solution 1 Drop(s) Both EYES at bedtime  loperamide 2 milliGRAM(s) Oral four times a day PRN Diarrhea  magnesium oxide 400 milliGRAM(s) Oral three times a day with meals  melatonin 3 milliGRAM(s) Oral at bedtime  posaconazole DR Tablet 100 milliGRAM(s) Oral daily  timolol 0.25% Solution 1 Drop(s) Both EYES daily    Labs:  CBC Full  -  ( 06 Nov 2017 08:32 )  WBC Count : 10.91 K/uL  Hemoglobin : 7.9 g/dL  Hematocrit : 25.0 %  Platelet Count - Automated : 19 K/uL  Mean Cell Volume : 97.3 fl  Mean Cell Hemoglobin : 30.7 pg  Mean Cell Hemoglobin Concentration : 31.6 gm/dL  Auto Neutrophil # : 0.41 K/uL  Auto Lymphocyte # : 3.22 K/uL  Auto Monocyte # : 1.87 K/uL  Auto Eosinophil # : 0.00 K/uL  Auto Basophil # : 0.00 K/uL  Auto Neutrophil % : 3.8 %  Auto Lymphocyte % : 29.5 %  Auto Monocyte % : 17.1 %  Auto Eosinophil % : 0.0 %  Auto Basophil % : 0.0 %    11-07    143  |  104  |  24<H>  ----------------------------<  91  4.2   |  25  |  1.37<H>    Ca    8.9      07 Nov 2017 09:35  Phos  4.1     11-06    TPro  4.9<L>  /  Alb  2.4<L>  /  TBili  0.7  /  DBili  x   /  AST  26  /  ALT  13  /  AlkPhos  136<H>  11-06  Lactate Dehydrogenase, Serum: 2440: Test Repeated IN DILUTION U/L (11.07.17 @ 09:35)    Uric Acid, Serum (11.05.17 @ 08:28)    Uric Acid, Serum: 5.6 mg/dL        Radiology:             ROS:  Patient comfortable   No SOB or chest pain  No palpitation  No abdominal pain, diarrhaea or constipation  No weakness of extremities  No skin changes or swelling of legs  Is not moving around though encouraged to do so with fall precautions    Vital Signs Last 24 Hrs  T(C): 36.4 (07 Nov 2017 13:35), Max: 37.6 (07 Nov 2017 00:52)  T(F): 97.5 (07 Nov 2017 13:35), Max: 99.7 (07 Nov 2017 00:52)  HR: 90 (07 Nov 2017 13:35) (80 - 95)  BP: 138/72 (07 Nov 2017 13:35) (101/60 - 144/77)  BP(mean): --  RR: 20 (07 Nov 2017 13:35) (18 - 20)  SpO2: 98% (07 Nov 2017 13:35) (95% - 99%)    Physical exam:  Patient alert and oriented  No distress, mucous membranes normal  CVS: S1, S2 regular or murmur  Chest: bilateral breath sound without rales  Abdomen: soft, not tender, no organomegaly or masses, obese  No focal neuro deficit  No edema.  Has on LLE little unchanged localized swelling from fall about 2 weeks back      Assessment and Plan:

## 2017-11-08 LAB
BASOPHILS # BLD AUTO: 0 K/UL — SIGNIFICANT CHANGE UP (ref 0–0.2)
BASOPHILS NFR BLD AUTO: 0 % — SIGNIFICANT CHANGE UP (ref 0–2)
BLD GP AB SCN SERPL QL: NEGATIVE — SIGNIFICANT CHANGE UP
EOSINOPHIL # BLD AUTO: 0 K/UL — SIGNIFICANT CHANGE UP (ref 0–0.5)
EOSINOPHIL NFR BLD AUTO: 0 % — SIGNIFICANT CHANGE UP (ref 0–6)
GLUCOSE BLDC GLUCOMTR-MCNC: 102 MG/DL — HIGH (ref 70–99)
GLUCOSE BLDC GLUCOMTR-MCNC: 105 MG/DL — HIGH (ref 70–99)
GLUCOSE BLDC GLUCOMTR-MCNC: 123 MG/DL — HIGH (ref 70–99)
GLUCOSE BLDC GLUCOMTR-MCNC: 94 MG/DL — SIGNIFICANT CHANGE UP (ref 70–99)
HCT VFR BLD CALC: 25 % — LOW (ref 39–50)
HGB BLD-MCNC: 8.4 G/DL — LOW (ref 13–17)
LYMPHOCYTES # BLD AUTO: 34 % — SIGNIFICANT CHANGE UP (ref 13–44)
LYMPHOCYTES # BLD AUTO: 7.14 K/UL — HIGH (ref 1–3.3)
MCHC RBC-ENTMCNC: 31.7 PG — SIGNIFICANT CHANGE UP (ref 27–34)
MCHC RBC-ENTMCNC: 33.6 GM/DL — SIGNIFICANT CHANGE UP (ref 32–36)
MCV RBC AUTO: 94.3 FL — SIGNIFICANT CHANGE UP (ref 80–100)
MONOCYTES # BLD AUTO: 4.41 K/UL — HIGH (ref 0–0.9)
MONOCYTES NFR BLD AUTO: 21 % — HIGH (ref 2–14)
NEUTROPHILS # BLD AUTO: 0.84 K/UL — LOW (ref 1.8–7.4)
NEUTROPHILS NFR BLD AUTO: 4 % — LOW (ref 43–77)
PLATELET # BLD AUTO: 19 K/UL — CRITICAL LOW (ref 150–400)
RBC # BLD: 2.65 M/UL — LOW (ref 4.2–5.8)
RBC # FLD: 22.4 % — HIGH (ref 10.3–14.5)
RH IG SCN BLD-IMP: POSITIVE — SIGNIFICANT CHANGE UP
WBC # BLD: 21.01 K/UL — HIGH (ref 3.8–10.5)
WBC # FLD AUTO: 21.01 K/UL — HIGH (ref 3.8–10.5)

## 2017-11-08 RX ORDER — DIPHENHYDRAMINE HCL 50 MG
25 CAPSULE ORAL ONCE
Qty: 0 | Refills: 0 | Status: COMPLETED | OUTPATIENT
Start: 2017-11-08 | End: 2017-11-08

## 2017-11-08 RX ORDER — ACETAMINOPHEN 500 MG
650 TABLET ORAL ONCE
Qty: 0 | Refills: 0 | Status: COMPLETED | OUTPATIENT
Start: 2017-11-08 | End: 2017-11-08

## 2017-11-08 RX ADMIN — LATANOPROST 1 DROP(S): 0.05 SOLUTION/ DROPS OPHTHALMIC; TOPICAL at 21:41

## 2017-11-08 RX ADMIN — Medication 300 MILLIGRAM(S): at 13:33

## 2017-11-08 RX ADMIN — Medication 875 MILLIGRAM(S): at 06:43

## 2017-11-08 RX ADMIN — Medication 1 APPLICATION(S): at 13:32

## 2017-11-08 RX ADMIN — Medication 25 MILLIGRAM(S): at 21:09

## 2017-11-08 RX ADMIN — MAGNESIUM OXIDE 400 MG ORAL TABLET 400 MILLIGRAM(S): 241.3 TABLET ORAL at 13:33

## 2017-11-08 RX ADMIN — POSACONAZOLE 100 MILLIGRAM(S): 100 TABLET, DELAYED RELEASE ORAL at 13:33

## 2017-11-08 RX ADMIN — MAGNESIUM OXIDE 400 MG ORAL TABLET 400 MILLIGRAM(S): 241.3 TABLET ORAL at 09:05

## 2017-11-08 RX ADMIN — Medication 650 MILLIGRAM(S): at 21:09

## 2017-11-08 RX ADMIN — Medication 875 MILLIGRAM(S): at 18:50

## 2017-11-08 RX ADMIN — MAGNESIUM OXIDE 400 MG ORAL TABLET 400 MILLIGRAM(S): 241.3 TABLET ORAL at 18:51

## 2017-11-08 RX ADMIN — Medication 3 MILLIGRAM(S): at 21:41

## 2017-11-08 RX ADMIN — Medication 1 DROP(S): at 13:33

## 2017-11-08 RX ADMIN — Medication 400 MILLIGRAM(S): at 18:50

## 2017-11-08 RX ADMIN — Medication 400 MILLIGRAM(S): at 06:43

## 2017-11-08 NOTE — DIETITIAN INITIAL EVALUATION ADULT. - NS FNS WEIGHT USED FOR CALC
250.6 pounds, IBW for prot needs ; defer fluid needs to medical team as patient is on chemo/admission

## 2017-11-08 NOTE — DIETITIAN INITIAL EVALUATION ADULT. - ENERGY NEEDS
ht: 5'10.5", wt:250.6 pounds, BMI:35.4 kg/m2, IBW:169 pounds +/- 10%    pt c AML, admitted c weakness and anemia. Noted pt is on experimental oral agent for chemo at this time.

## 2017-11-08 NOTE — DIETITIAN INITIAL EVALUATION ADULT. - ORAL INTAKE PTA
poor/pt reports suboptimal appetite at home, reports he was not taking any oral nutritional supplements at home

## 2017-11-08 NOTE — PROGRESS NOTE ADULT - SUBJECTIVE AND OBJECTIVE BOX
no complaints    MEDICATIONS  (STANDING):  acyclovir   Tablet 400 milliGRAM(s) Oral two times a day  allopurinol 300 milliGRAM(s) Oral daily  ALPRAZolam 0.5 milliGRAM(s) Oral daily  amoxicillin/clavulanate Oral Tab/Cap - Peds 875 milliGRAM(s) Oral two times a day  BACItracin   Ointment 1 Application(s) Topical daily  cytarabine Injectable 46 milliGRAM(s) SubCutaneous daily  dextrose 5% + sodium chloride 0.45% 1000 milliLiter(s) (75 mL/Hr) IV Continuous <Continuous>  latanoprost 0.005% Ophthalmic Solution 1 Drop(s) Both EYES at bedtime  magnesium oxide 400 milliGRAM(s) Oral three times a day with meals  melatonin 3 milliGRAM(s) Oral at bedtime  posaconazole DR Tablet 100 milliGRAM(s) Oral daily  timolol 0.25% Solution 1 Drop(s) Both EYES daily    MEDICATIONS  (PRN):  diphenhydrAMINE   Capsule 50 milliGRAM(s) Oral once PRN Rash and/or Itching  loperamide 2 milliGRAM(s) Oral four times a day PRN Diarrhea      Vital Signs Last 24 Hrs  T(C): 36.5 (08 Nov 2017 05:28), Max: 37 (07 Nov 2017 17:43)  T(F): 97.7 (08 Nov 2017 05:28), Max: 98.6 (07 Nov 2017 17:43)  HR: 93 (08 Nov 2017 06:22) (75 - 95)  BP: 118/70 (08 Nov 2017 05:28) (100/60 - 140/70)  BP(mean): --  RR: 18 (08 Nov 2017 05:28) (18 - 20)  SpO2: 95% (08 Nov 2017 06:22) (95% - 100%)  CAPILLARY BLOOD GLUCOSE      POCT Blood Glucose.: 102 mg/dL (08 Nov 2017 07:40)  POCT Blood Glucose.: 117 mg/dL (07 Nov 2017 21:11)  POCT Blood Glucose.: 97 mg/dL (07 Nov 2017 16:21)  POCT Blood Glucose.: 106 mg/dL (07 Nov 2017 11:14)    I&O's Summary    07 Nov 2017 07:01  -  08 Nov 2017 07:00  --------------------------------------------------------  IN: 3021 mL / OUT: 2100 mL / NET: 921 mL        PHYSICAL EXAM:  HEAD:  Atraumatic, Normocephalic  NECK: Supple, No JVD  CHEST/LUNG: Clear to auscultation bilaterally; No wheeze  HEART: Regular rate and rhythm; No murmurs, rubs, or gallops  ABDOMEN: Soft, Nontender, ; Bowel sounds   EXTREMITIES:    no  edema  NEUROLOGY:  alert    LABS:                        8.2    13.06 )-----------( 18       ( 07 Nov 2017 09:19 )             24.4     11-07    143  |  104  |  24<H>  ----------------------------<  91  4.2   |  25  |  1.37<H>    Ca    8.9      07 Nov 2017 09:35  Phos  4.1     11-06    TPro  4.9<L>  /  Alb  2.4<L>  /  TBili  0.7  /  DBili  x   /  AST  26  /  ALT  13  /  AlkPhos  136<H>  11-06                        Consultant(s) Notes Reviewed:      Care Discussed with Consultants/Other Providers:

## 2017-11-08 NOTE — DIETITIAN INITIAL EVALUATION ADULT. - OTHER INFO
pt seen for LOS. pt seen for LOS. Attempted to speak c pt multiple times, however, each time pt requested RD either come back and upon returning for the third time pt declined interview since he was tired. Agreeable to trying Glucerna shake at lunch time- states he does not even want to eat lunch at this time. As per PCA, pt had consumed a little of breakfast this AM.

## 2017-11-08 NOTE — PROGRESS NOTE ADULT - ASSESSMENT
pt  with  h/o  MDS, now  with  AML, s/ p  mlple  trials  at  [facility], with progression  of  disease  sent  to  [facility]  for  prbc   onc  dr llanos   on acyclovir/ Augmentin  and posaconazole  , daily  for  prophylaxis   pt   is on  an   oral  agent daily/ venetodax,  .  and   s/q  Theodora C,  daily    for  7  days    daily  cbc  di,   on iv fluids    pt  with  hypotension in  er,  dc  lopressor  on iv   fluids  PICC line, left upper  arm  dvt  ppx   pt with c/c  diarrhea  for  months,  resolving  labs, pending today  prbc/ platelets, as needed,  wbc has  decreased

## 2017-11-08 NOTE — PROGRESS NOTE ADULT - ASSESSMENT
Acute myelogenous leukemia  on venetoclax, lilo-c  dose reduced ventoclax with posaconazole  no evidence of tumor lysis, d/c IV fluids given h/o CHF  slowly trending decreased wbc    ambulate, consider d/c after Lilo-C tomorrow  but needs to be ambulating

## 2017-11-08 NOTE — DIETITIAN INITIAL EVALUATION ADULT. - PHYSICAL APPEARANCE
appears well nourished, unable to perform nutrition focused physical exam at this time as pt no agreeable to interview/well nourished

## 2017-11-08 NOTE — PROGRESS NOTE ADULT - SUBJECTIVE AND OBJECTIVE BOX
acute leukemia  hpi 67 year old man with acute leukemia s/p exp trial minimal response now on venetoclax and Theodora-C  pmh sh fh unchanged   7 pt ros depressed, not ambulating  physical  97.7 90 118/70 18 96 sat  obese  lungs clear  cor [id number] soft obese   ext no edema  healing ecchymosis right ankle from trauma    data pending

## 2017-11-08 NOTE — DIETITIAN INITIAL EVALUATION ADULT. - FACTORS AFF FOOD INTAKE
pt reports suboptimal appetite in house pt reports suboptimal appetite in house; noted pt was having diarrhea at home and in house it has been improving

## 2017-11-09 LAB
ALBUMIN SERPL ELPH-MCNC: 3 G/DL — LOW (ref 3.3–5)
ALP SERPL-CCNC: 173 U/L — HIGH (ref 40–120)
ALT FLD-CCNC: 17 U/L RC — SIGNIFICANT CHANGE UP (ref 10–45)
ANION GAP SERPL CALC-SCNC: 14 MMOL/L — SIGNIFICANT CHANGE UP (ref 5–17)
AST SERPL-CCNC: 26 U/L — SIGNIFICANT CHANGE UP (ref 10–40)
BASOPHILS # BLD AUTO: 0 K/UL — SIGNIFICANT CHANGE UP (ref 0–0.2)
BASOPHILS NFR BLD AUTO: 0 % — SIGNIFICANT CHANGE UP (ref 0–2)
BILIRUB SERPL-MCNC: 1 MG/DL — SIGNIFICANT CHANGE UP (ref 0.2–1.2)
BUN SERPL-MCNC: 23 MG/DL — SIGNIFICANT CHANGE UP (ref 7–23)
CALCIUM SERPL-MCNC: 9.4 MG/DL — SIGNIFICANT CHANGE UP (ref 8.4–10.5)
CHLORIDE SERPL-SCNC: 102 MMOL/L — SIGNIFICANT CHANGE UP (ref 96–108)
CO2 SERPL-SCNC: 25 MMOL/L — SIGNIFICANT CHANGE UP (ref 22–31)
CREAT SERPL-MCNC: 1.45 MG/DL — HIGH (ref 0.5–1.3)
EOSINOPHIL # BLD AUTO: 0 K/UL — SIGNIFICANT CHANGE UP (ref 0–0.5)
EOSINOPHIL NFR BLD AUTO: 0 % — SIGNIFICANT CHANGE UP (ref 0–6)
GLUCOSE BLDC GLUCOMTR-MCNC: 113 MG/DL — HIGH (ref 70–99)
GLUCOSE BLDC GLUCOMTR-MCNC: 93 MG/DL — SIGNIFICANT CHANGE UP (ref 70–99)
GLUCOSE BLDC GLUCOMTR-MCNC: 95 MG/DL — SIGNIFICANT CHANGE UP (ref 70–99)
GLUCOSE BLDC GLUCOMTR-MCNC: 96 MG/DL — SIGNIFICANT CHANGE UP (ref 70–99)
GLUCOSE SERPL-MCNC: 92 MG/DL — SIGNIFICANT CHANGE UP (ref 70–99)
HCT VFR BLD CALC: 31.7 % — LOW (ref 39–50)
HGB BLD-MCNC: 10.8 G/DL — LOW (ref 13–17)
LDH SERPL L TO P-CCNC: 2081 U/L — HIGH (ref 50–242)
LYMPHOCYTES # BLD AUTO: 16 % — SIGNIFICANT CHANGE UP (ref 13–44)
LYMPHOCYTES # BLD AUTO: 5.28 K/UL — HIGH (ref 1–3.3)
MCHC RBC-ENTMCNC: 31.1 PG — SIGNIFICANT CHANGE UP (ref 27–34)
MCHC RBC-ENTMCNC: 34.1 GM/DL — SIGNIFICANT CHANGE UP (ref 32–36)
MCV RBC AUTO: 91.4 FL — SIGNIFICANT CHANGE UP (ref 80–100)
MONOCYTES # BLD AUTO: 7.26 K/UL — HIGH (ref 0–0.9)
MONOCYTES NFR BLD AUTO: 22 % — HIGH (ref 2–14)
NEUTROPHILS # BLD AUTO: 0.66 K/UL — LOW (ref 1.8–7.4)
NEUTROPHILS NFR BLD AUTO: 2 % — LOW (ref 43–77)
PLATELET # BLD AUTO: 32 K/UL — LOW (ref 150–400)
POTASSIUM SERPL-MCNC: 3.6 MMOL/L — SIGNIFICANT CHANGE UP (ref 3.5–5.3)
POTASSIUM SERPL-SCNC: 3.6 MMOL/L — SIGNIFICANT CHANGE UP (ref 3.5–5.3)
PROT SERPL-MCNC: 5.4 G/DL — LOW (ref 6–8.3)
RBC # BLD: 3.47 M/UL — LOW (ref 4.2–5.8)
RBC # FLD: 21.4 % — HIGH (ref 10.3–14.5)
SODIUM SERPL-SCNC: 141 MMOL/L — SIGNIFICANT CHANGE UP (ref 135–145)
WBC # BLD: 32.99 K/UL — HIGH (ref 3.8–10.5)
WBC # FLD AUTO: 32.99 K/UL — HIGH (ref 3.8–10.5)

## 2017-11-09 RX ORDER — HYDROXYUREA 500 MG/1
500 CAPSULE ORAL
Qty: 0 | Refills: 0 | Status: DISCONTINUED | OUTPATIENT
Start: 2017-11-09 | End: 2017-11-12

## 2017-11-09 RX ADMIN — Medication 1 DROP(S): at 13:21

## 2017-11-09 RX ADMIN — LATANOPROST 1 DROP(S): 0.05 SOLUTION/ DROPS OPHTHALMIC; TOPICAL at 22:21

## 2017-11-09 RX ADMIN — Medication 1 APPLICATION(S): at 13:22

## 2017-11-09 RX ADMIN — POSACONAZOLE 100 MILLIGRAM(S): 100 TABLET, DELAYED RELEASE ORAL at 13:20

## 2017-11-09 RX ADMIN — Medication 400 MILLIGRAM(S): at 05:13

## 2017-11-09 RX ADMIN — MAGNESIUM OXIDE 400 MG ORAL TABLET 400 MILLIGRAM(S): 241.3 TABLET ORAL at 08:23

## 2017-11-09 RX ADMIN — Medication 300 MILLIGRAM(S): at 13:19

## 2017-11-09 RX ADMIN — MAGNESIUM OXIDE 400 MG ORAL TABLET 400 MILLIGRAM(S): 241.3 TABLET ORAL at 13:20

## 2017-11-09 RX ADMIN — MAGNESIUM OXIDE 400 MG ORAL TABLET 400 MILLIGRAM(S): 241.3 TABLET ORAL at 17:18

## 2017-11-09 RX ADMIN — Medication 875 MILLIGRAM(S): at 17:18

## 2017-11-09 RX ADMIN — Medication 3 MILLIGRAM(S): at 22:20

## 2017-11-09 RX ADMIN — HYDROXYUREA 500 MILLIGRAM(S): 500 CAPSULE ORAL at 17:18

## 2017-11-09 RX ADMIN — Medication 875 MILLIGRAM(S): at 05:12

## 2017-11-09 RX ADMIN — Medication 2 MILLIGRAM(S): at 22:20

## 2017-11-09 RX ADMIN — Medication 50 MILLIGRAM(S): at 22:21

## 2017-11-09 RX ADMIN — Medication 0.5 MILLIGRAM(S): at 13:28

## 2017-11-09 RX ADMIN — Medication 400 MILLIGRAM(S): at 17:18

## 2017-11-09 NOTE — PROGRESS NOTE ADULT - SUBJECTIVE AND OBJECTIVE BOX
in bed,  no  specific  complaints    MEDICATIONS  (STANDING):  acyclovir   Tablet 400 milliGRAM(s) Oral two times a day  allopurinol 300 milliGRAM(s) Oral daily  ALPRAZolam 0.5 milliGRAM(s) Oral daily  amoxicillin/clavulanate Oral Tab/Cap - Peds 875 milliGRAM(s) Oral two times a day  BACItracin   Ointment 1 Application(s) Topical daily  cytarabine Injectable 46 milliGRAM(s) SubCutaneous daily  latanoprost 0.005% Ophthalmic Solution 1 Drop(s) Both EYES at bedtime  magnesium oxide 400 milliGRAM(s) Oral three times a day with meals  melatonin 3 milliGRAM(s) Oral at bedtime  posaconazole DR Tablet 100 milliGRAM(s) Oral daily  timolol 0.25% Solution 1 Drop(s) Both EYES daily    MEDICATIONS  (PRN):  diphenhydrAMINE   Capsule 50 milliGRAM(s) Oral once PRN Rash and/or Itching  loperamide 2 milliGRAM(s) Oral four times a day PRN Diarrhea      Vital Signs Last 24 Hrs  T(C): 36.3 (09 Nov 2017 01:37), Max: 37.1 (08 Nov 2017 21:35)  T(F): 97.4 (09 Nov 2017 01:37), Max: 98.7 (08 Nov 2017 21:35)  HR: 96 (09 Nov 2017 05:48) (92 - 113)  BP: 118/72 (09 Nov 2017 01:37) (115/65 - 134/69)  BP(mean): --  RR: 16 (09 Nov 2017 01:37) (16 - 19)  SpO2: 96% (09 Nov 2017 05:48) (95% - 99%)  CAPILLARY BLOOD GLUCOSE      POCT Blood Glucose.: 93 mg/dL (09 Nov 2017 07:52)  POCT Blood Glucose.: 105 mg/dL (08 Nov 2017 21:45)  POCT Blood Glucose.: 94 mg/dL (08 Nov 2017 16:27)  POCT Blood Glucose.: 123 mg/dL (08 Nov 2017 11:47)    I&O's Summary    08 Nov 2017 07:01  -  09 Nov 2017 07:00  --------------------------------------------------------  IN: 1800 mL / OUT: 2450 mL / NET: -650 mL        PHYSICAL EXAM:  HEAD:  Atraumatic, Normocephalic  NECK: Supple, No JVD  CHEST/LUNG: Clear to auscultation bilaterally; No wheeze  HEART: Regular rate and rhythm; No murmurs, rubs, or gallops  ABDOMEN: Soft, Nontender, ; Bowel sounds   EXTREMITIES:    no  edema  NEUROLOGY:  alert    LABS:                        8.4    21.01 )-----------( 19       ( 08 Nov 2017 08:29 )             25.0     11-09    141  |  102  |  23  ----------------------------<  92  3.6   |  25  |  1.45<H>    Ca    9.4      09 Nov 2017 07:02    TPro  5.4<L>  /  Alb  3.0<L>  /  TBili  1.0  /  DBili  x   /  AST  26  /  ALT  17  /  AlkPhos  173<H>  11-09                        Consultant(s) Notes Reviewed:      Care Discussed with Consultants/Other Providers:

## 2017-11-09 NOTE — PROGRESS NOTE ADULT - ASSESSMENT
pt  with  h/o  MDS, now  with  AML, s/ p  mlple  trials  at  [facility], with progression  of  disease  sent  to  [facility]  for  prbc   onc  dr llanos   on acyclovir/ Augmentin  and posaconazole  , daily  for  prophylaxis   pt   is on  an   oral  agent daily/ venetodax,  .  and   s/q  Theodora C,  daily    for  7  days    daily  cbc  di,   on iv fluids    pt  with  hypotension in  er,  dc  lopressor  on iv   fluids  PICC line, left upper  arm  dvt  ppx   pt with c/c  diarrhea  for  months,  resolving  labs, pending today  prbc/ platelets, as needed,  wbc has  decreased  pt  to  complete  theodora  c , today, however  pt states, he  si  not ready  to  go  home today,  heme  to  f/p

## 2017-11-09 NOTE — PROGRESS NOTE ADULT - ASSESSMENT
acute myelogenous leukemia on venetoclax, lilo-C posaconazole  last day lilo-C today  no improvement in blasts yet no sign tumor lysis  ambulate  continue venetoclax as outpatient  not optimistic  will need to address goals of care

## 2017-11-09 NOTE — PROGRESS NOTE ADULT - SUBJECTIVE AND OBJECTIVE BOX
acute leukemia  HPI 67 year old man with acute myelogenous leukemia, after MDS on venetoclax, Theodora-C after failing clinical trial  pmh sh fh unchanged 7 pt ros fatigue otherwise neg  physical  obese 97.4 96 118/72 16 96  lungs clear  cor s1s2  abd soft nontender obese ecchymoses from injections improving  ext no edema, healing ecchymosis, no petechia    data  wbc pending hgb 10.8 hct 31.7 plt pending  Cr 1.45 alb 3.0 alk phos 173

## 2017-11-10 LAB
ALBUMIN SERPL ELPH-MCNC: 3.1 G/DL — LOW (ref 3.3–5)
ALP SERPL-CCNC: 166 U/L — HIGH (ref 40–120)
ALT FLD-CCNC: 15 U/L RC — SIGNIFICANT CHANGE UP (ref 10–45)
ANION GAP SERPL CALC-SCNC: 14 MMOL/L — SIGNIFICANT CHANGE UP (ref 5–17)
AST SERPL-CCNC: 22 U/L — SIGNIFICANT CHANGE UP (ref 10–40)
BASOPHILS # BLD AUTO: 0 K/UL — SIGNIFICANT CHANGE UP (ref 0–0.2)
BILIRUB SERPL-MCNC: 0.9 MG/DL — SIGNIFICANT CHANGE UP (ref 0.2–1.2)
BLASTS # FLD: 63 % — HIGH (ref 0–0)
BUN SERPL-MCNC: 26 MG/DL — HIGH (ref 7–23)
CALCIUM SERPL-MCNC: 9.8 MG/DL — SIGNIFICANT CHANGE UP (ref 8.4–10.5)
CHLORIDE SERPL-SCNC: 105 MMOL/L — SIGNIFICANT CHANGE UP (ref 96–108)
CO2 SERPL-SCNC: 26 MMOL/L — SIGNIFICANT CHANGE UP (ref 22–31)
CREAT SERPL-MCNC: 1.6 MG/DL — HIGH (ref 0.5–1.3)
EOSINOPHIL # BLD AUTO: 0.1 K/UL — SIGNIFICANT CHANGE UP (ref 0–0.5)
GLUCOSE BLDC GLUCOMTR-MCNC: 105 MG/DL — HIGH (ref 70–99)
GLUCOSE BLDC GLUCOMTR-MCNC: 90 MG/DL — SIGNIFICANT CHANGE UP (ref 70–99)
GLUCOSE BLDC GLUCOMTR-MCNC: 98 MG/DL — SIGNIFICANT CHANGE UP (ref 70–99)
GLUCOSE BLDC GLUCOMTR-MCNC: 99 MG/DL — SIGNIFICANT CHANGE UP (ref 70–99)
GLUCOSE SERPL-MCNC: 94 MG/DL — SIGNIFICANT CHANGE UP (ref 70–99)
HCT VFR BLD CALC: 32 % — LOW (ref 39–50)
HGB BLD-MCNC: 10.8 G/DL — LOW (ref 13–17)
LYMPHOCYTES # BLD AUTO: 15 % — SIGNIFICANT CHANGE UP (ref 13–44)
LYMPHOCYTES # BLD AUTO: 3.4 K/UL — HIGH (ref 1–3.3)
MCHC RBC-ENTMCNC: 31.5 PG — SIGNIFICANT CHANGE UP (ref 27–34)
MCHC RBC-ENTMCNC: 33.8 GM/DL — SIGNIFICANT CHANGE UP (ref 32–36)
MCV RBC AUTO: 93.1 FL — SIGNIFICANT CHANGE UP (ref 80–100)
MONOCYTES # BLD AUTO: 9.5 K/UL — HIGH (ref 0–0.9)
MONOCYTES NFR BLD AUTO: 17 % — HIGH (ref 2–14)
NEUTROPHILS # BLD AUTO: SIGNIFICANT CHANGE UP (ref 1.8–7.4)
NEUTROPHILS NFR BLD AUTO: 2 % — LOW (ref 43–77)
PLAT MORPH BLD: NORMAL — SIGNIFICANT CHANGE UP
PLATELET # BLD AUTO: 15 K/UL — CRITICAL LOW (ref 150–400)
POTASSIUM SERPL-MCNC: 3.3 MMOL/L — LOW (ref 3.5–5.3)
POTASSIUM SERPL-SCNC: 3.3 MMOL/L — LOW (ref 3.5–5.3)
PROMYELOCYTES # FLD: 3 % — HIGH (ref 0–0)
PROT SERPL-MCNC: 5.5 G/DL — LOW (ref 6–8.3)
RBC # BLD: 3.44 M/UL — LOW (ref 4.2–5.8)
RBC # FLD: 19.2 % — HIGH (ref 10.3–14.5)
RBC BLD AUTO: SIGNIFICANT CHANGE UP
SODIUM SERPL-SCNC: 145 MMOL/L — SIGNIFICANT CHANGE UP (ref 135–145)
WBC # BLD: 36.2 K/UL — HIGH (ref 3.8–10.5)
WBC # FLD AUTO: 36.2 K/UL — HIGH (ref 3.8–10.5)

## 2017-11-10 RX ORDER — CYTARABINE 100 MG
46 VIAL (EA) INJECTION DAILY
Qty: 0 | Refills: 0 | Status: DISCONTINUED | OUTPATIENT
Start: 2017-11-10 | End: 2017-11-12

## 2017-11-10 RX ORDER — SODIUM CHLORIDE 9 MG/ML
1000 INJECTION INTRAMUSCULAR; INTRAVENOUS; SUBCUTANEOUS
Qty: 0 | Refills: 0 | Status: DISCONTINUED | OUTPATIENT
Start: 2017-11-10 | End: 2017-11-12

## 2017-11-10 RX ORDER — ONDANSETRON 8 MG/1
16 TABLET, FILM COATED ORAL ONCE
Qty: 0 | Refills: 0 | Status: COMPLETED | OUTPATIENT
Start: 2017-11-10 | End: 2017-11-10

## 2017-11-10 RX ORDER — DIPHENOXYLATE HCL/ATROPINE 2.5-.025MG
1 TABLET ORAL THREE TIMES A DAY
Qty: 0 | Refills: 0 | Status: DISCONTINUED | OUTPATIENT
Start: 2017-11-10 | End: 2017-11-12

## 2017-11-10 RX ORDER — POTASSIUM CHLORIDE 20 MEQ
40 PACKET (EA) ORAL ONCE
Qty: 0 | Refills: 0 | Status: COMPLETED | OUTPATIENT
Start: 2017-11-10 | End: 2017-11-10

## 2017-11-10 RX ORDER — DIPHENHYDRAMINE HCL 50 MG
50 CAPSULE ORAL ONCE
Qty: 0 | Refills: 0 | Status: COMPLETED | OUTPATIENT
Start: 2017-11-10 | End: 2017-11-10

## 2017-11-10 RX ADMIN — Medication 875 MILLIGRAM(S): at 17:48

## 2017-11-10 RX ADMIN — Medication 2 MILLIGRAM(S): at 17:49

## 2017-11-10 RX ADMIN — MAGNESIUM OXIDE 400 MG ORAL TABLET 400 MILLIGRAM(S): 241.3 TABLET ORAL at 07:54

## 2017-11-10 RX ADMIN — Medication 875 MILLIGRAM(S): at 05:29

## 2017-11-10 RX ADMIN — Medication 1 TABLET(S): at 21:37

## 2017-11-10 RX ADMIN — Medication 3 MILLIGRAM(S): at 21:36

## 2017-11-10 RX ADMIN — Medication 1 DROP(S): at 12:25

## 2017-11-10 RX ADMIN — SODIUM CHLORIDE 50 MILLILITER(S): 9 INJECTION INTRAMUSCULAR; INTRAVENOUS; SUBCUTANEOUS at 13:39

## 2017-11-10 RX ADMIN — Medication 40 MILLIEQUIVALENT(S): at 09:18

## 2017-11-10 RX ADMIN — Medication 1 TABLET(S): at 13:45

## 2017-11-10 RX ADMIN — Medication 300 MILLIGRAM(S): at 12:25

## 2017-11-10 RX ADMIN — Medication 400 MILLIGRAM(S): at 17:48

## 2017-11-10 RX ADMIN — Medication 2 MILLIGRAM(S): at 07:54

## 2017-11-10 RX ADMIN — LATANOPROST 1 DROP(S): 0.05 SOLUTION/ DROPS OPHTHALMIC; TOPICAL at 21:37

## 2017-11-10 RX ADMIN — HYDROXYUREA 500 MILLIGRAM(S): 500 CAPSULE ORAL at 17:48

## 2017-11-10 RX ADMIN — HYDROXYUREA 500 MILLIGRAM(S): 500 CAPSULE ORAL at 05:29

## 2017-11-10 RX ADMIN — Medication 1 APPLICATION(S): at 12:26

## 2017-11-10 RX ADMIN — POSACONAZOLE 100 MILLIGRAM(S): 100 TABLET, DELAYED RELEASE ORAL at 12:25

## 2017-11-10 RX ADMIN — Medication 400 MILLIGRAM(S): at 05:29

## 2017-11-10 RX ADMIN — ONDANSETRON 116 MILLIGRAM(S): 8 TABLET, FILM COATED ORAL at 14:43

## 2017-11-10 NOTE — PROVIDER CONTACT NOTE (CRITICAL VALUE NOTIFICATION) - BACKGROUND
AML
MDS converted to AML
pt admitted with anemia, , pt with history of leukemia and anemia
pt admitted with diagnosis of anemia, pt with history of leukemia, anemia
AML

## 2017-11-10 NOTE — PROGRESS NOTE ADULT - SUBJECTIVE AND OBJECTIVE BOX
acute leukemia  hpi 67 year old man with h/o mds did well on decitabine for six months then converted to acute leukemia  Minimal response to experimental trial now on venetoclax and lilo-C  pmh sh fh unchanged 7 pt ros diarrhea  physical obese  96.4 104 116/79 18 100  ent no lesions, no petechiae  lungs clear  cor s1s2  abd soft obese  ext no edema no petechiae    data  wbc 60597 hgb 10.8 hct 32 plt [id number] yesterday 60 blasts  Cr 1.6

## 2017-11-10 NOTE — ADVANCED PRACTICE NURSE CONSULT - REASON FOR CONSULT
Chemotherapy note day 8/7of Venetoclax and Cytarabine SQ Chemotherapy note day 8/10of Venetoclax and Cytarabine SQ

## 2017-11-10 NOTE — PROVIDER CONTACT NOTE (CRITICAL VALUE NOTIFICATION) - ASSESSMENT
Pt in stable condition.
Stable
no bleeding/afebrile
pt alert and oriented  times three
pt alert and oriented times three
VSS. Pt denies any pain or SOB. No active S&S of bleeding.

## 2017-11-10 NOTE — PROGRESS NOTE ADULT - ASSESSMENT
acute myelogenous leukemia on venetoclax and lilo-c 7 days no response yet  spoke to dr salazar needs 10 days of lilo-C  may not see response for a couple cycles  likely d/c sunday

## 2017-11-11 ENCOUNTER — TRANSCRIPTION ENCOUNTER (OUTPATIENT)
Age: 67
End: 2017-11-11

## 2017-11-11 LAB
ANION GAP SERPL CALC-SCNC: 13 MMOL/L — SIGNIFICANT CHANGE UP (ref 5–17)
BUN SERPL-MCNC: 30 MG/DL — HIGH (ref 7–23)
CALCIUM SERPL-MCNC: 9 MG/DL — SIGNIFICANT CHANGE UP (ref 8.4–10.5)
CHLORIDE SERPL-SCNC: 105 MMOL/L — SIGNIFICANT CHANGE UP (ref 96–108)
CO2 SERPL-SCNC: 26 MMOL/L — SIGNIFICANT CHANGE UP (ref 22–31)
CREAT SERPL-MCNC: 1.62 MG/DL — HIGH (ref 0.5–1.3)
GLUCOSE BLDC GLUCOMTR-MCNC: 100 MG/DL — HIGH (ref 70–99)
GLUCOSE BLDC GLUCOMTR-MCNC: 102 MG/DL — HIGH (ref 70–99)
GLUCOSE BLDC GLUCOMTR-MCNC: 122 MG/DL — HIGH (ref 70–99)
GLUCOSE BLDC GLUCOMTR-MCNC: 94 MG/DL — SIGNIFICANT CHANGE UP (ref 70–99)
GLUCOSE SERPL-MCNC: 86 MG/DL — SIGNIFICANT CHANGE UP (ref 70–99)
HCT VFR BLD CALC: 29.5 % — LOW (ref 39–50)
HGB BLD-MCNC: 9.8 G/DL — LOW (ref 13–17)
MCHC RBC-ENTMCNC: 31.1 PG — SIGNIFICANT CHANGE UP (ref 27–34)
MCHC RBC-ENTMCNC: 33.2 GM/DL — SIGNIFICANT CHANGE UP (ref 32–36)
MCV RBC AUTO: 93.7 FL — SIGNIFICANT CHANGE UP (ref 80–100)
PLATELET # BLD AUTO: 62 K/UL — LOW (ref 150–400)
POTASSIUM SERPL-MCNC: 4.4 MMOL/L — SIGNIFICANT CHANGE UP (ref 3.5–5.3)
POTASSIUM SERPL-SCNC: 4.4 MMOL/L — SIGNIFICANT CHANGE UP (ref 3.5–5.3)
RBC # BLD: 3.15 M/UL — LOW (ref 4.2–5.8)
RBC # FLD: 21.8 % — HIGH (ref 10.3–14.5)
SODIUM SERPL-SCNC: 144 MMOL/L — SIGNIFICANT CHANGE UP (ref 135–145)
WBC # BLD: 17.6 K/UL — HIGH (ref 3.8–10.5)
WBC # FLD AUTO: 17.6 K/UL — HIGH (ref 3.8–10.5)

## 2017-11-11 RX ADMIN — Medication 1 TABLET(S): at 21:20

## 2017-11-11 RX ADMIN — SODIUM CHLORIDE 50 MILLILITER(S): 9 INJECTION INTRAMUSCULAR; INTRAVENOUS; SUBCUTANEOUS at 05:35

## 2017-11-11 RX ADMIN — POSACONAZOLE 100 MILLIGRAM(S): 100 TABLET, DELAYED RELEASE ORAL at 11:41

## 2017-11-11 RX ADMIN — Medication 1 TABLET(S): at 05:35

## 2017-11-11 RX ADMIN — Medication 1 TABLET(S): at 13:25

## 2017-11-11 RX ADMIN — Medication 875 MILLIGRAM(S): at 05:35

## 2017-11-11 RX ADMIN — LATANOPROST 1 DROP(S): 0.05 SOLUTION/ DROPS OPHTHALMIC; TOPICAL at 21:21

## 2017-11-11 RX ADMIN — Medication 300 MILLIGRAM(S): at 11:41

## 2017-11-11 RX ADMIN — Medication 875 MILLIGRAM(S): at 17:57

## 2017-11-11 RX ADMIN — Medication 2 MILLIGRAM(S): at 05:35

## 2017-11-11 RX ADMIN — Medication 1 DROP(S): at 11:41

## 2017-11-11 RX ADMIN — Medication 1 APPLICATION(S): at 11:41

## 2017-11-11 RX ADMIN — Medication 400 MILLIGRAM(S): at 17:57

## 2017-11-11 RX ADMIN — HYDROXYUREA 500 MILLIGRAM(S): 500 CAPSULE ORAL at 17:57

## 2017-11-11 RX ADMIN — Medication 400 MILLIGRAM(S): at 05:35

## 2017-11-11 RX ADMIN — Medication 3 MILLIGRAM(S): at 21:21

## 2017-11-11 RX ADMIN — HYDROXYUREA 500 MILLIGRAM(S): 500 CAPSULE ORAL at 05:35

## 2017-11-11 NOTE — DISCHARGE NOTE ADULT - CARE PROVIDER_API CALL
Jossue De Souza), Hematology; Medical Oncology  [facility] Hematology Oncology  [address] [place] [address]  Phone: [phone]  Fax: [phone]

## 2017-11-11 NOTE — DISCHARGE NOTE ADULT - HOSPITAL COURSE
67 year old, leukemia / AML,  progressed  from , MDS,, now  with  anemia     h/o  dm,    patient follows with [facility] and has  had  mlple  transfusions, .  was  on  numerous  trials, which  have  not  worked,    denies,  blood in stool. no vomiting. no fever. Sent in for  transfusion and inpatient chemo, fatigue and diarrhea. History of diarrhea but neg c diff. Currently on augmentin and pozaconaoxole,  and  acyclovir,  fro  ppx. 67 year old, leukemia / AML,  progressed  from , MDS,, admitted for  anemia , required 4 units of PRBC and 1 bag of platelets. Pt also have chronic diarrhea , Pt's electrolytes stable able to tolerate soft diet , Hb/Hct today --------, is being discharged home on Venetoclax  oral chemotherapy and posaconazole and with close follow up with DR De Souza on 11/14/2017     h/o  dm,    patient follows with [facility] and has  had  mlple  transfusions, .  was  on  numerous  trials, which  have  not  worked,    denies,  blood in stool. no vomiting. no fever. Sent in for  transfusion and inpatient chemo, fatigue and diarrhea. History of diarrhea but neg c diff. Currently on augmentin and pozaconaoxole,  and  acyclovir,  fro  ppx. 67 year old, leukemia / AML,  progressed  from , MDS,, admitted for  anemia , required 4 units of PRBC and 1 bag of platelets. Pt also have chronic diarrhea , Pt's electrolytes stable able to tolerate soft diet ,  stable Hb/Hct  is being discharged home on Venetoclax  oral chemotherapy and posaconazole and with close follow up with DR De Souza on 11/14/2017 .

## 2017-11-11 NOTE — PROGRESS NOTE ADULT - ASSESSMENT
Acute myelogenous leukemia on venetoclax and cytarabine no improvement in counts yet   continue cytarabine until tomorow, continue hydrea

## 2017-11-11 NOTE — DISCHARGE NOTE ADULT - HOME CARE AGENCY
[place] [phone] RN visit day after discharge   [facility] [phone]

## 2017-11-11 NOTE — DISCHARGE NOTE ADULT - MEDICATION SUMMARY - MEDICATIONS TO STOP TAKING
I will STOP taking the medications listed below when I get home from the hospital:  None I will STOP taking the medications listed below when I get home from the hospital:    metoprolol    Toprol-XL 25 mg oral tablet, extended release  -- 751 tab(s) by mouth once a day

## 2017-11-11 NOTE — DISCHARGE NOTE ADULT - PATIENT PORTAL LINK FT
“You can access the [facility] Patient Portal, offered by [facility], by registering with the following website: [email or web address]”

## 2017-11-11 NOTE — PROGRESS NOTE ADULT - SUBJECTIVE AND OBJECTIVE BOX
acute myelogenous leukemia  hpi 67 year old man with AML after MDS minimal response to exp trial, on venetoclax and cytarabine   still requiring hydrea to control wbc  pmh sh fh unchanged 7 pt ros diarrhea  physical  obese  97.6 82 112/70 16 99 pct sat  lungs clear  cor s1s2  abd soft obese healing ecchymoses  ext ecchymosis lue  psych normal    data pending

## 2017-11-11 NOTE — DISCHARGE NOTE ADULT - MEDICATION SUMMARY - MEDICATIONS TO TAKE
I will START or STAY ON the medications listed below when I get home from the hospital:    loperamide 2 mg oral capsule  -- 1 cap(s) by mouth 4 times a day, As needed, Diarrhea  -- Indication: For diarrhea    diphenoxylate-atropine 2.5 mg-0.025 mg oral tablet  -- 1 tab(s) by mouth 3 times a day  -- Indication: For Diarrhea    posaconazole 100 mg oral delayed release tablet  -- orally once a day  -- Indication: For Antifungal for prophylaxis    allopurinol 300 mg oral tablet  -- 1 tab(s) by mouth once a day  -- Indication: For Antigout    cytarabine 20 mg/mL injectable solution  --  injectable , 46 mg SQ daily till 11/13/2017  -- Indication: For chemo    acyclovir 400 mg oral tablet  -- orally 2 times a day  -- Indication: For Antiviral  for prophylaxis     bacitracin 500 units/g topical ointment  -- 1 application on skin once a day  -- Indication: For topical    venetoclax  -- 150 mg po daily till 11/30/2017  -- Indication: For chemo    latanoprost 0.005% ophthalmic solution  -- 1 drop(s) to each affected eye once a day (in the evening)  -- Indication: For eye drops     timolol hemihydrate 0.5% ophthalmic solution  -- 1 drop(s) to each affected eye 2 times a day  -- Indication: For eyed drops    Augmentin 875 mg-125 mg oral tablet  -- 1 tab(s) by mouth every 12 hours  -- Indication: For Antibiotics for prophylaxis I will START or STAY ON the medications listed below when I get home from the hospital:    loperamide 2 mg oral capsule  -- 1 cap(s) by mouth 4 times a day, As needed, Diarrhea  -- Indication: For diarrhea    diphenoxylate-atropine 2.5 mg-0.025 mg oral tablet  -- 1 tab(s) by mouth 3 times a day  -- Indication: For Diarrhea    posaconazole 100 mg oral delayed release tablet  -- orally once a day  -- Indication: For Antifungal for prophylaxis    allopurinol 300 mg oral tablet  -- 1 tab(s) by mouth once a day  -- Indication: For Antigout    acyclovir 400 mg oral tablet  -- orally 2 times a day  -- Indication: For Antiviral  for prophylaxis     bacitracin 500 units/g topical ointment  -- 1 application on skin once a day  -- Indication: For topical    venetoclax  -- 150 mg po daily till 11/30/2017  -- Indication: For chemo    latanoprost 0.005% ophthalmic solution  -- 1 drop(s) to each affected eye once a day (in the evening)  -- Indication: For eye drops     timolol hemihydrate 0.5% ophthalmic solution  -- 1 drop(s) to each affected eye 2 times a day  -- Indication: For eyed drops    Augmentin 875 mg-125 mg oral tablet  -- 1 tab(s) by mouth every 12 hours  -- Indication: For Antibiotics for prophylaxis

## 2017-11-11 NOTE — DISCHARGE NOTE ADULT - PLAN OF CARE
Remission Diet and activity as noted  Continue medications as prescribed  Follow up with Dr. De Souza on Monday  Return for any worsening symtpoms s/p PRBC transfusion Received 4 units of PRBC  and 1 bag of platelet with this admission   please follow up with DR De Souza on 11/14/2017 c/w Venetoclax  please follow up with DR De Souza on 11/14/2017 please follow up with PCP for DM management and HbA1C testing

## 2017-11-11 NOTE — DISCHARGE NOTE ADULT - CARE PLAN
Principal Discharge DX:	Leukemia not having achieved remission, unspecified leukemia type  Goal:	Remission  Instructions for follow-up, activity and diet:	Diet and activity as noted  Continue medications as prescribed  Follow up with Dr. De Souza on Monday  Return for any worsening symtpoms Principal Discharge DX:	Anemia  Goal:	s/p PRBC transfusion  Instructions for follow-up, activity and diet:	Received 4 units of PRBC  and 1 bag of platelet with this admission   please follow up with DR De Souza on 11/14/2017  Secondary Diagnosis:	Leukemia  Instructions for follow-up, activity and diet:	c/w Venetoclax  please follow up with DR De Souza on 11/14/2017  Secondary Diagnosis:	Diabetes  Instructions for follow-up, activity and diet:	please follow up with PCP for DM management and HbA1C testing

## 2017-11-11 NOTE — PROGRESS NOTE ADULT - SUBJECTIVE AND OBJECTIVE BOX
resting  MEDICATIONS  (STANDING):  acyclovir   Tablet 400 milliGRAM(s) Oral two times a day  allopurinol 300 milliGRAM(s) Oral daily  amoxicillin/clavulanate Oral Tab/Cap - Peds 875 milliGRAM(s) Oral two times a day  BACItracin   Ointment 1 Application(s) Topical daily  cytarabine Injectable 46 milliGRAM(s) SubCutaneous daily  cytarabine Injectable 46 milliGRAM(s) SubCutaneous daily  diphenoxylate/atropine 1 Tablet(s) Oral three times a day  hydroxyurea 500 milliGRAM(s) Oral two times a day  latanoprost 0.005% Ophthalmic Solution 1 Drop(s) Both EYES at bedtime  melatonin 3 milliGRAM(s) Oral at bedtime  posaconazole DR Tablet 100 milliGRAM(s) Oral daily  sodium chloride 0.9%. 1000 milliLiter(s) (50 mL/Hr) IV Continuous <Continuous>  timolol 0.25% Solution 1 Drop(s) Both EYES daily  venetoclax (VENCLEXTA) 150 milliGRAM(s) 150 milliGRAM(s) Oral daily    MEDICATIONS  (PRN):  loperamide 2 milliGRAM(s) Oral four times a day PRN Diarrhea      Vital Signs Last 24 Hrs  T(C): 36.4 (11 Nov 2017 05:04), Max: 36.7 (10 Nov 2017 21:52)  T(F): 97.6 (11 Nov 2017 05:04), Max: 98.1 (10 Nov 2017 21:52)  HR: 82 (11 Nov 2017 05:04) (82 - 109)  BP: 112/70 (11 Nov 2017 05:04) (105/68 - 119/74)  BP(mean): --  RR: 16 (11 Nov 2017 05:04) (16 - 18)  SpO2: 99% (11 Nov 2017 05:04) (96% - 100%)  CAPILLARY BLOOD GLUCOSE      POCT Blood Glucose.: 102 mg/dL (11 Nov 2017 07:30)  POCT Blood Glucose.: 98 mg/dL (10 Nov 2017 21:45)  POCT Blood Glucose.: 90 mg/dL (10 Nov 2017 16:28)  POCT Blood Glucose.: 99 mg/dL (10 Nov 2017 11:44)    I&O's Summary    10 Nov 2017 07:01  -  11 Nov 2017 07:00  --------------------------------------------------------  IN: 1898 mL / OUT: 1625 mL / NET: 273 mL        PHYSICAL EXAM:  HEAD:  Atraumatic, Normocephalic  NECK: Supple, No JVD  CHEST/LUNG: Clear to auscultation bilaterally; No wheeze  HEART: Regular rate and rhythm; No murmurs, rubs, or gallops  ABDOMEN: Soft, Nontender, ; Bowel sounds   EXTREMITIES:    no  edema  NEUROLOGY:  alert    LABS:                        10.8   36.2  )-----------( 15       ( 10 Nov 2017 06:55 )             32.0     11-10    145  |  105  |  26<H>  ----------------------------<  94  3.3<L>   |  26  |  1.60<H>    Ca    9.8      10 Nov 2017 06:55    TPro  5.5<L>  /  Alb  3.1<L>  /  TBili  0.9  /  DBili  x   /  AST  22  /  ALT  15  /  AlkPhos  166<H>  11-10                        Consultant(s) Notes Reviewed:      Care Discussed with Consultants/Other Providers:

## 2017-11-11 NOTE — PROGRESS NOTE ADULT - ASSESSMENT
pt  with  h/o  MDS, now  with  AML, s/ p  mlple  trials  at  [facility], with progression  of  disease  sent  to  [facility]  for  prbc   onc  dr llanos   on acyclovir/ Augmentin  and posaconazole  , daily  for  prophylaxis   pt   is on  an   oral  agent daily/ venetodax,  .  and   s/q  Theodora C,  daily    for  7  days    daily  cbc  di,   on iv fluids    pt  with  hypotension in  er,  dc  lopressor  on iv   fluids  PICC line, left upper  arm  dvt  ppx   pt with c/c  diarrhea  for  months,  resolving  labs, pending today  prbc/ platelets, as needed  heme to  f/p . pt not  motivated    to  ambulate

## 2017-11-12 VITALS
SYSTOLIC BLOOD PRESSURE: 121 MMHG | OXYGEN SATURATION: 97 % | HEART RATE: 93 BPM | TEMPERATURE: 98 F | RESPIRATION RATE: 18 BRPM | DIASTOLIC BLOOD PRESSURE: 76 MMHG

## 2017-11-12 LAB
ANISOCYTOSIS BLD QL: SIGNIFICANT CHANGE UP
BASOPHILS # BLD AUTO: 0 K/UL — SIGNIFICANT CHANGE UP (ref 0–0.2)
BASOPHILS NFR BLD AUTO: 0 % — SIGNIFICANT CHANGE UP (ref 0–2)
BLASTS # FLD: 31 % — HIGH (ref 0–0)
ELLIPTOCYTES BLD QL SMEAR: SLIGHT — SIGNIFICANT CHANGE UP
EOSINOPHIL # BLD AUTO: 0 K/UL — SIGNIFICANT CHANGE UP (ref 0–0.5)
EOSINOPHIL NFR BLD AUTO: 0 % — SIGNIFICANT CHANGE UP (ref 0–6)
GLUCOSE BLDC GLUCOMTR-MCNC: 79 MG/DL — SIGNIFICANT CHANGE UP (ref 70–99)
GLUCOSE BLDC GLUCOMTR-MCNC: 87 MG/DL — SIGNIFICANT CHANGE UP (ref 70–99)
HCT VFR BLD CALC: 28.1 % — LOW (ref 39–50)
HGB BLD-MCNC: 9.3 G/DL — LOW (ref 13–17)
HYPOCHROMIA BLD QL: SLIGHT — SIGNIFICANT CHANGE UP
LYMPHOCYTES # BLD AUTO: 3.07 K/UL — SIGNIFICANT CHANGE UP (ref 1–3.3)
LYMPHOCYTES # BLD AUTO: 32 % — SIGNIFICANT CHANGE UP (ref 13–44)
MACROCYTES BLD QL: SIGNIFICANT CHANGE UP
MANUAL SMEAR VERIFICATION: SIGNIFICANT CHANGE UP
MCHC RBC-ENTMCNC: 30.9 PG — SIGNIFICANT CHANGE UP (ref 27–34)
MCHC RBC-ENTMCNC: 33.1 GM/DL — SIGNIFICANT CHANGE UP (ref 32–36)
MCV RBC AUTO: 93.4 FL — SIGNIFICANT CHANGE UP (ref 80–100)
MICROCYTES BLD QL: SLIGHT — SIGNIFICANT CHANGE UP
MONOCYTES # BLD AUTO: 3.36 K/UL — HIGH (ref 0–0.9)
MONOCYTES NFR BLD AUTO: 35 % — HIGH (ref 2–14)
NEUTROPHILS # BLD AUTO: 0.19 K/UL — LOW (ref 1.8–7.4)
NEUTROPHILS NFR BLD AUTO: 2 % — LOW (ref 43–77)
NRBC # BLD: 2 /100 — HIGH (ref 0–0)
PLAT MORPH BLD: NORMAL — SIGNIFICANT CHANGE UP
PLATELET # BLD AUTO: 28 K/UL — LOW (ref 150–400)
POIKILOCYTOSIS BLD QL AUTO: SLIGHT — SIGNIFICANT CHANGE UP
RBC # BLD: 3.01 M/UL — LOW (ref 4.2–5.8)
RBC # FLD: 21.2 % — HIGH (ref 10.3–14.5)
RBC BLD AUTO: ABNORMAL
WBC # BLD: 9.59 K/UL — SIGNIFICANT CHANGE UP (ref 3.8–10.5)
WBC # FLD AUTO: 9.59 K/UL — SIGNIFICANT CHANGE UP (ref 3.8–10.5)

## 2017-11-12 PROCEDURE — 86850 RBC ANTIBODY SCREEN: CPT

## 2017-11-12 PROCEDURE — 82803 BLOOD GASES ANY COMBINATION: CPT

## 2017-11-12 PROCEDURE — 84550 ASSAY OF BLOOD/URIC ACID: CPT

## 2017-11-12 PROCEDURE — 83605 ASSAY OF LACTIC ACID: CPT

## 2017-11-12 PROCEDURE — 85730 THROMBOPLASTIN TIME PARTIAL: CPT

## 2017-11-12 PROCEDURE — 86900 BLOOD TYPING SEROLOGIC ABO: CPT

## 2017-11-12 PROCEDURE — P9040: CPT

## 2017-11-12 PROCEDURE — 85014 HEMATOCRIT: CPT

## 2017-11-12 PROCEDURE — 84132 ASSAY OF SERUM POTASSIUM: CPT

## 2017-11-12 PROCEDURE — 83735 ASSAY OF MAGNESIUM: CPT

## 2017-11-12 PROCEDURE — 99285 EMERGENCY DEPT VISIT HI MDM: CPT

## 2017-11-12 PROCEDURE — 82947 ASSAY GLUCOSE BLOOD QUANT: CPT

## 2017-11-12 PROCEDURE — P9037: CPT

## 2017-11-12 PROCEDURE — 82435 ASSAY OF BLOOD CHLORIDE: CPT

## 2017-11-12 PROCEDURE — 86901 BLOOD TYPING SEROLOGIC RH(D): CPT

## 2017-11-12 PROCEDURE — 80048 BASIC METABOLIC PNL TOTAL CA: CPT

## 2017-11-12 PROCEDURE — 71045 X-RAY EXAM CHEST 1 VIEW: CPT

## 2017-11-12 PROCEDURE — 84100 ASSAY OF PHOSPHORUS: CPT

## 2017-11-12 PROCEDURE — 85027 COMPLETE CBC AUTOMATED: CPT

## 2017-11-12 PROCEDURE — 80053 COMPREHEN METABOLIC PANEL: CPT

## 2017-11-12 PROCEDURE — 97530 THERAPEUTIC ACTIVITIES: CPT

## 2017-11-12 PROCEDURE — 82330 ASSAY OF CALCIUM: CPT

## 2017-11-12 PROCEDURE — 97116 GAIT TRAINING THERAPY: CPT

## 2017-11-12 PROCEDURE — 85610 PROTHROMBIN TIME: CPT

## 2017-11-12 PROCEDURE — 82962 GLUCOSE BLOOD TEST: CPT

## 2017-11-12 PROCEDURE — 83615 LACTATE (LD) (LDH) ENZYME: CPT

## 2017-11-12 PROCEDURE — P9016: CPT

## 2017-11-12 PROCEDURE — 84295 ASSAY OF SERUM SODIUM: CPT

## 2017-11-12 PROCEDURE — 36430 TRANSFUSION BLD/BLD COMPNT: CPT

## 2017-11-12 PROCEDURE — 97162 PT EVAL MOD COMPLEX 30 MIN: CPT

## 2017-11-12 PROCEDURE — 86923 COMPATIBILITY TEST ELECTRIC: CPT

## 2017-11-12 PROCEDURE — 94660 CPAP INITIATION&MGMT: CPT

## 2017-11-12 RX ORDER — BACITRACIN ZINC 500 UNIT/G
1 OINTMENT IN PACKET (EA) TOPICAL
Qty: 0 | Refills: 0 | COMMUNITY
Start: 2017-11-12

## 2017-11-12 RX ORDER — LOPERAMIDE HCL 2 MG
1 TABLET ORAL
Qty: 0 | Refills: 0 | COMMUNITY

## 2017-11-12 RX ORDER — VENETOCLAX 100 MG/1
0 TABLET, FILM COATED ORAL
Qty: 0 | Refills: 0 | COMMUNITY

## 2017-11-12 RX ORDER — LATANOPROST 0.05 MG/ML
0 SOLUTION/ DROPS OPHTHALMIC; TOPICAL
Qty: 0 | Refills: 0 | COMMUNITY

## 2017-11-12 RX ORDER — CYTARABINE 100 MG
0 VIAL (EA) INJECTION
Qty: 0 | Refills: 0 | COMMUNITY
Start: 2017-11-12

## 2017-11-12 RX ORDER — TIMOLOL MALEATE 10 MG/1
0 TABLET ORAL
Qty: 0 | Refills: 0 | COMMUNITY

## 2017-11-12 RX ORDER — DIPHENOXYLATE HCL/ATROPINE 2.5-.025MG
0 TABLET ORAL
Qty: 0 | Refills: 0 | COMMUNITY

## 2017-11-12 RX ORDER — DIPHENOXYLATE HCL/ATROPINE 2.5-.025MG
1 TABLET ORAL
Qty: 0 | Refills: 0 | COMMUNITY
Start: 2017-11-12

## 2017-11-12 RX ORDER — LOPERAMIDE HCL 2 MG
1 TABLET ORAL
Qty: 0 | Refills: 0 | COMMUNITY
Start: 2017-11-12

## 2017-11-12 RX ORDER — METOPROLOL TARTRATE 50 MG
75 TABLET ORAL
Qty: 0 | Refills: 0 | COMMUNITY

## 2017-11-12 RX ADMIN — HYDROXYUREA 500 MILLIGRAM(S): 500 CAPSULE ORAL at 05:30

## 2017-11-12 RX ADMIN — Medication 1 TABLET(S): at 05:30

## 2017-11-12 RX ADMIN — Medication 300 MILLIGRAM(S): at 11:21

## 2017-11-12 RX ADMIN — POSACONAZOLE 100 MILLIGRAM(S): 100 TABLET, DELAYED RELEASE ORAL at 11:20

## 2017-11-12 RX ADMIN — Medication 875 MILLIGRAM(S): at 05:30

## 2017-11-12 RX ADMIN — Medication 1 APPLICATION(S): at 11:20

## 2017-11-12 RX ADMIN — SODIUM CHLORIDE 50 MILLILITER(S): 9 INJECTION INTRAMUSCULAR; INTRAVENOUS; SUBCUTANEOUS at 05:30

## 2017-11-12 RX ADMIN — Medication 1 DROP(S): at 11:20

## 2017-11-12 RX ADMIN — Medication 400 MILLIGRAM(S): at 05:30

## 2017-11-12 RX ADMIN — Medication 1 TABLET(S): at 13:43

## 2017-11-12 NOTE — CHART NOTE - NSCHARTNOTEFT_GEN_A_CORE
Asked to facilitated discharge home , Pt seen and examined no active complaints medications reviewed and reconciled with DR llanos and DR Roland , Discharge meds reviewed with Pt and spouse , Pt is being discharged on Venetoclax and prophylactic abx , with close follow up with DR Llanos on 11/14/2017    Lu Underwood NP-C [id number]

## 2017-11-12 NOTE — PROGRESS NOTE ADULT - SUBJECTIVE AND OBJECTIVE BOX
acute myelogenous leukemia  hpi 67 year old man with acute leukemia on venetoclax, lilo-C intermittent hydrea  pmh sh fh unchanged 7 pt ros feels better, diarrhea improved, ambulating  physical  elderly  96 82 111/68 18 100 sat  lungs clear   cor s1s2  abd soft obese  ext no edema  skin healing ecchymoses    data  11/11 wbc 50586 hgb 9.8 hct 29.5 plt 17934  Cr 1.62

## 2017-11-12 NOTE — PROGRESS NOTE ADULT - ASSESSMENT
pt  with  h/o  MDS, now  with  AML, s/ p  mlple  trials  at  [facility], with progression  of  disease  sent  to  [facility]  for  prbc   onc  dr llanos   on acyclovir/ Augmentin  and posaconazole  , daily  for  prophylaxis   pt   is on  an   oral  agent daily/ venetodax,  .  and   s/q  Theodora C,  daily    for  10  days    daily  cbc  di,   on iv fluids    pt  with  hypotension in  er,  dc  lopressor  on iv   fluids  PICC line, left upper  arm  dvt  ppx   pt with c/c  diarrhea  for  months,  resolving  labs, pending today  prbc/ platelets, as needed  heme to  f/p . pt not  motivated    to  ambulate  ?  dc today, defer to heme, cbc pending

## 2017-11-12 NOTE — PROGRESS NOTE ADULT - SUBJECTIVE AND OBJECTIVE BOX
in bed    MEDICATIONS  (STANDING):  acyclovir   Tablet 400 milliGRAM(s) Oral two times a day  allopurinol 300 milliGRAM(s) Oral daily  amoxicillin/clavulanate Oral Tab/Cap - Peds 875 milliGRAM(s) Oral two times a day  BACItracin   Ointment 1 Application(s) Topical daily  cytarabine Injectable 46 milliGRAM(s) SubCutaneous daily  cytarabine Injectable 46 milliGRAM(s) SubCutaneous daily  diphenoxylate/atropine 1 Tablet(s) Oral three times a day  hydroxyurea 500 milliGRAM(s) Oral two times a day  latanoprost 0.005% Ophthalmic Solution 1 Drop(s) Both EYES at bedtime  melatonin 3 milliGRAM(s) Oral at bedtime  posaconazole DR Tablet 100 milliGRAM(s) Oral daily  sodium chloride 0.9%. 1000 milliLiter(s) (50 mL/Hr) IV Continuous <Continuous>  timolol 0.25% Solution 1 Drop(s) Both EYES daily  venetoclax (VENCLEXTA) 150 milliGRAM(s) 150 milliGRAM(s) Oral daily    MEDICATIONS  (PRN):  loperamide 2 milliGRAM(s) Oral four times a day PRN Diarrhea      Vital Signs Last 24 Hrs  T(C): 35.6 (12 Nov 2017 05:46), Max: 36.7 (11 Nov 2017 21:14)  T(F): 96.1 (12 Nov 2017 05:46), Max: 98.1 (11 Nov 2017 21:14)  HR: 82 (12 Nov 2017 05:46) (82 - 93)  BP: 111/68 (12 Nov 2017 05:46) (101/65 - 122/75)  BP(mean): --  RR: 18 (12 Nov 2017 05:46) (18 - 20)  SpO2: 100% (12 Nov 2017 05:46) (96% - 100%)  CAPILLARY BLOOD GLUCOSE      POCT Blood Glucose.: 122 mg/dL (11 Nov 2017 21:11)  POCT Blood Glucose.: 94 mg/dL (11 Nov 2017 16:27)  POCT Blood Glucose.: 100 mg/dL (11 Nov 2017 11:13)  POCT Blood Glucose.: 102 mg/dL (11 Nov 2017 07:30)    I&O's Summary    10 Nov 2017 07:01  -  11 Nov 2017 07:00  --------------------------------------------------------  IN: 1898 mL / OUT: 1625 mL / NET: 273 mL    11 Nov 2017 07:01  -  12 Nov 2017 06:47  --------------------------------------------------------  IN: 1875 mL / OUT: 950 mL / NET: 925 mL        PHYSICAL EXAM:  HEAD:  Atraumatic, Normocephalic  NECK: Supple, No JVD  CHEST/LUNG: Clear to auscultation bilaterally; No wheeze  HEART: Regular rate and rhythm; No murmurs, rubs, or gallops  ABDOMEN: Soft, Nontender, ; Bowel sounds   EXTREMITIES:     edema  NEUROLOGY:  alert    LABS:                        9.8    17.60 )-----------( 62       ( 11 Nov 2017 08:30 )             29.5     11-11    144  |  105  |  30<H>  ----------------------------<  86  4.4   |  26  |  1.62<H>    Ca    9.0      11 Nov 2017 08:39    TPro  5.5<L>  /  Alb  3.1<L>  /  TBili  0.9  /  DBili  x   /  AST  22  /  ALT  15  /  AlkPhos  166<H>  11-10                        Consultant(s) Notes Reviewed:      Care Discussed with Consultants/Other Providers:

## 2017-11-12 NOTE — PROGRESS NOTE ADULT - ASSESSMENT
acute myelogenous leukemia on venetoclax, lilo-C s/p 10 days lilo-C continue venetoclax along with posaconazole  Recovery of platelets is good sign but await decrease in blasts  depending on wbc will decide hydrea dose  If platelets continue in this range, can restart anticoagulation given h/o thrombosis  followup in my office on tuesday  continue all antibiotics, allopurinol

## 2017-11-12 NOTE — ADVANCED PRACTICE NURSE CONSULT - ASSESSMENT
Pt. seen in in bed alert and oriented times four, Chemotherapy teaching done and pt. verbalized understanding . Lab result reviewed by Dr. De Souza during rounds.  Drug verification done with primary nurse.Venetoclax 150 mg po given by the Primary RN, At 1400 Cytarabine 20 mg/m2= 42 mg SQ to right upper arm given.Site with no s/s of bleeding/swelling or redness noted,bandaid applied. Left pt in bed awake. Report given to primary nurse.
Arrive to find pt in bed alert and oriented times four. Pt out of bed to chair with assistance no distress noted. Picc line to left arm intact no swelling or redness at site. Chemotherapy teaching done pt verbalized understanding also given drug cards which outline side effect. Lab result reviewed by Dr De Souza during his rounds. Pt was pre-medicated with zofran 16 mg ivss. Drug verification done with primary nurse. At 1400 given Venetoclax 150 mg Po by primary nurse as order, and by chemotherapy nurse to right upper arm Cytarabine 20 mg/m2= 42 mg SQ to right upper arm. Left pt in bed sleeping. Report given to primary nurse.
Pt found sitting in chair. No c/o. Labs from 11/11 WBC 17.6 HGB 9.8 HCT 29.5 platelets 62 serum creatine 1.62 . Chemotherapy verified with primary RN. Pt received Cytarabine 20mg/m2=46mg subcutaneously via right arm @ 1120. Site without any redness, swelling or tenderness noted. Primary RN aware of plan of care.
Pt found sitting in chair. No c/o. Labs today WBC 32.99 HGB 10.8 HCT 31.7 platelets 32 serum creatine 1.45 t.bili 1.0 Chemotherapy verified with primary RN. Pt received Cytarabine 20mg/m2=46mg  subcutaneously viia right arm @ 1410. Site without any redness,swelling or tenderness noted. Primary RN aware of plan of care.
Pt found sitting in chair. No c/o. Labs today WBC 36.2 HGB 10.8 HCT 32.0 platelets 15 serum creatine 1.6 t.bili 0.9. Chemotherapy verified with primary RN. Pt received  Cytarabine 20mg/m2=46mg subcutaneously via right arm @ 1315. Site without any redness, swelling or tenderness noted.  Primary RN aware of plan of care.
Pt was transferred fro another unit to us alert and oriented times four. Pt out of bed to chair with assistance no distress noted. Picc line to left arm intact no swelling or redness at site. Chemotherapy teaching done pt verbalized understanding also given drug cards which outline side effect. Lab result reviewed by Dr De Souza during his rounds. Pt was pre-medicated with zofran 16 mg ivss. Drug verification done with primary nurse. At 1400 given Venetoclax 150 mg Po as order and to right upper arm Cytarabine 20 mg/m2= 42 mg SQ to right upper arm. Left pt in bed sleeping. Report given to primary nurse.
Pt. seen in chair, alert and oriented times four.  Chemotherapy teaching done, pt and wife  verbalized understanding also given drug cards which outline side effect.  Pt was pre-medicated with zofran 16 mg ivss. Drug verification done with primary nurse. At 1400 given Venetoclax 150 mg Po as order and to right upper arm Cytarabine 20 mg/m2= 46 mg SQ to right upper arm.Site with no bleeding and swelling. Left pt in bed sleeping. Report given to primary nurse.
Pt. seen in in bed alert and oriented times four, Chemotherapy teaching done and pt. verbalized understanding . Lab result reviewed by Dr. De Souza  during rounds.  Drug verification done with chemo nurse.Venetoclax 150 mg po given, At 1515 Cytarabine 20 mg/m2= 42 mg SQ to right upper arm given.Site with no s/s of bleeding/swelling or redness noted,bandaid applied. Left pt in bed awake,will follow
Pt. seen in in bed alert and oriented times four, Chemotherapy teaching done and pt. verbalized understanding . Lab result reviewed by Dr. De Souza  during rounds.  Drug verification done with primary nurse.Venetoclax 150 mg po given by the Primary RN, At 1400 Cytarabine 20 mg/m2= 42 mg SQ to right upper arm given.Site with no s/s of bleeding/swelling or redness noted,bandaid applied. Left pt in bed awake. Report given to primary nurse.
Pt. seen in in bed alert and oriented times four, Chemotherapy teaching done and pt. verbalized understanding . Lab result reviewed by Dr. Jang  during rounds.  Drug verification done with primary nurse.Venetoclax 150 mg po given by the Primary RN, At 1400 Cytarabine 20 mg/m2= 42 mg SQ to right upper arm given.Site with no s/s of bleeding/swelling or redness noted,bandaid applied. Left pt in bed awake. Report given to primary nurse.
Pt. seen in in bed alert and oriented times four. Pt out of bed to chair with assistance no distress noted. Chemotherapy teaching done and pt. verbalized understanding ,also given drug cards which outline side effect. Lab result reviewed by Dr. Watkins  during his rounds.  Drug verification done with primary nurse. At 1400 Cytarabine 20 mg/m2= 42 mg SQ to right upper arm given.Site with no s/s of bleeding/swelling or redness noted,bandaid applied. Left pt in bed sleeping. Report given to primary nurse.

## 2017-11-14 ENCOUNTER — EMERGENCY (EMERGENCY)
Facility: HOSPITAL | Age: 67
LOS: 1 days | Discharge: HOME | End: 2017-11-14
Attending: EMERGENCY MEDICINE | Admitting: EMERGENCY MEDICINE
Payer: COMMERCIAL

## 2017-11-14 VITALS — HEART RATE: 83 BPM | RESPIRATION RATE: 18 BRPM | SYSTOLIC BLOOD PRESSURE: 111 MMHG | DIASTOLIC BLOOD PRESSURE: 65 MMHG

## 2017-11-14 VITALS
OXYGEN SATURATION: 100 % | DIASTOLIC BLOOD PRESSURE: 55 MMHG | RESPIRATION RATE: 18 BRPM | HEART RATE: 76 BPM | TEMPERATURE: 98 F | SYSTOLIC BLOOD PRESSURE: 100 MMHG

## 2017-11-14 PROBLEM — C95.90 LEUKEMIA, UNSPECIFIED NOT HAVING ACHIEVED REMISSION: Chronic | Status: ACTIVE | Noted: 2017-11-02

## 2017-11-14 PROBLEM — E11.9 TYPE 2 DIABETES MELLITUS WITHOUT COMPLICATIONS: Chronic | Status: ACTIVE | Noted: 2017-11-02

## 2017-11-14 LAB
ALBUMIN SERPL ELPH-MCNC: 3.2 G/DL — LOW (ref 3.3–5)
ALP SERPL-CCNC: 144 U/L — HIGH (ref 40–120)
ALT FLD-CCNC: 14 U/L RC — SIGNIFICANT CHANGE UP (ref 10–45)
ANION GAP SERPL CALC-SCNC: 12 MMOL/L — SIGNIFICANT CHANGE UP (ref 5–17)
APPEARANCE UR: ABNORMAL
AST SERPL-CCNC: 19 U/L — SIGNIFICANT CHANGE UP (ref 10–40)
BASOPHILS # BLD AUTO: 0.1 K/UL — SIGNIFICANT CHANGE UP (ref 0–0.2)
BASOPHILS NFR BLD AUTO: 0 % — SIGNIFICANT CHANGE UP (ref 0–2)
BILIRUB SERPL-MCNC: 1.2 MG/DL — SIGNIFICANT CHANGE UP (ref 0.2–1.2)
BILIRUB UR-MCNC: NEGATIVE — SIGNIFICANT CHANGE UP
BLD GP AB SCN SERPL QL: NEGATIVE — SIGNIFICANT CHANGE UP
BUN SERPL-MCNC: 29 MG/DL — HIGH (ref 7–23)
CALCIUM SERPL-MCNC: 9.7 MG/DL — SIGNIFICANT CHANGE UP (ref 8.4–10.5)
CHLORIDE SERPL-SCNC: 105 MMOL/L — SIGNIFICANT CHANGE UP (ref 96–108)
CK SERPL-CCNC: 15 U/L — LOW (ref 30–200)
CO2 SERPL-SCNC: 25 MMOL/L — SIGNIFICANT CHANGE UP (ref 22–31)
COLOR SPEC: YELLOW — SIGNIFICANT CHANGE UP
CREAT SERPL-MCNC: 1.57 MG/DL — HIGH (ref 0.5–1.3)
DIFF PNL FLD: NEGATIVE — SIGNIFICANT CHANGE UP
EOSINOPHIL # BLD AUTO: 0 K/UL — SIGNIFICANT CHANGE UP (ref 0–0.5)
EOSINOPHIL NFR BLD AUTO: 0 % — SIGNIFICANT CHANGE UP (ref 0–6)
EPI CELLS # UR: SIGNIFICANT CHANGE UP /HPF
GAS PNL BLDV: SIGNIFICANT CHANGE UP
GLUCOSE SERPL-MCNC: 105 MG/DL — HIGH (ref 70–99)
GLUCOSE UR QL: NEGATIVE — SIGNIFICANT CHANGE UP
HCT VFR BLD CALC: 26.8 % — LOW (ref 39–50)
HGB BLD-MCNC: 9.3 G/DL — LOW (ref 13–17)
HYALINE CASTS # UR AUTO: ABNORMAL
KETONES UR-MCNC: NEGATIVE — SIGNIFICANT CHANGE UP
LDH SERPL L TO P-CCNC: 1081 U/L — HIGH (ref 50–242)
LEUKOCYTE ESTERASE UR-ACNC: NEGATIVE — SIGNIFICANT CHANGE UP
LYMPHOCYTES # BLD AUTO: 25 % — SIGNIFICANT CHANGE UP (ref 13–44)
LYMPHOCYTES # BLD AUTO: 3 K/UL — SIGNIFICANT CHANGE UP (ref 1–3.3)
MCHC RBC-ENTMCNC: 32.8 PG — SIGNIFICANT CHANGE UP (ref 27–34)
MCHC RBC-ENTMCNC: 34.6 GM/DL — SIGNIFICANT CHANGE UP (ref 32–36)
MCV RBC AUTO: 94.7 FL — SIGNIFICANT CHANGE UP (ref 80–100)
MONOCYTES # BLD AUTO: 7.9 K/UL — HIGH (ref 0–0.9)
MONOCYTES NFR BLD AUTO: 25 % — HIGH (ref 2–14)
NEUTROPHILS # BLD AUTO: SIGNIFICANT CHANGE UP (ref 1.8–7.4)
NEUTROPHILS NFR BLD AUTO: 1 % — LOW (ref 43–77)
NITRITE UR-MCNC: NEGATIVE — SIGNIFICANT CHANGE UP
PH UR: 5.5 — SIGNIFICANT CHANGE UP (ref 5–8)
PLATELET # BLD AUTO: 9 K/UL — CRITICAL LOW (ref 150–400)
POTASSIUM SERPL-MCNC: 3.4 MMOL/L — LOW (ref 3.5–5.3)
POTASSIUM SERPL-SCNC: 3.4 MMOL/L — LOW (ref 3.5–5.3)
PROT SERPL-MCNC: 5.2 G/DL — LOW (ref 6–8.3)
PROT UR-MCNC: 30 MG/DL
RBC # BLD: 2.83 M/UL — LOW (ref 4.2–5.8)
RBC # FLD: 18.5 % — HIGH (ref 10.3–14.5)
RH IG SCN BLD-IMP: POSITIVE — SIGNIFICANT CHANGE UP
SODIUM SERPL-SCNC: 142 MMOL/L — SIGNIFICANT CHANGE UP (ref 135–145)
SP GR SPEC: 1.02 — SIGNIFICANT CHANGE UP (ref 1.01–1.02)
TROPONIN T SERPL-MCNC: 0.08 NG/ML — HIGH (ref 0–0.06)
TROPONIN T SERPL-MCNC: 0.08 NG/ML — HIGH (ref 0–0.06)
URATE SERPL-MCNC: 5.9 MG/DL — SIGNIFICANT CHANGE UP (ref 3.4–8.8)
UROBILINOGEN FLD QL: NEGATIVE — SIGNIFICANT CHANGE UP
WBC # BLD: 24.3 K/UL — HIGH (ref 3.8–10.5)
WBC # FLD AUTO: 24.3 K/UL — HIGH (ref 3.8–10.5)
WBC UR QL: SIGNIFICANT CHANGE UP /HPF (ref 0–5)

## 2017-11-14 PROCEDURE — 71010: CPT | Mod: 26

## 2017-11-14 PROCEDURE — 85060 BLOOD SMEAR INTERPRETATION: CPT

## 2017-11-14 PROCEDURE — 99285 EMERGENCY DEPT VISIT HI MDM: CPT | Mod: 25

## 2017-11-14 PROCEDURE — 93010 ELECTROCARDIOGRAM REPORT: CPT

## 2017-11-14 PROCEDURE — 93971 EXTREMITY STUDY: CPT | Mod: 26

## 2017-11-14 RX ORDER — OXYCODONE HYDROCHLORIDE 5 MG/1
5 TABLET ORAL ONCE
Qty: 0 | Refills: 0 | Status: DISCONTINUED | OUTPATIENT
Start: 2017-11-14 | End: 2017-11-14

## 2017-11-14 RX ORDER — OXYCODONE AND ACETAMINOPHEN 5; 325 MG/1; MG/1
1 TABLET ORAL ONCE
Qty: 0 | Refills: 0 | Status: DISCONTINUED | OUTPATIENT
Start: 2017-11-14 | End: 2017-11-14

## 2017-11-14 RX ORDER — ACETAMINOPHEN 500 MG
1000 TABLET ORAL ONCE
Qty: 0 | Refills: 0 | Status: DISCONTINUED | OUTPATIENT
Start: 2017-11-14 | End: 2017-11-14

## 2017-11-14 RX ORDER — DIPHENHYDRAMINE HCL 50 MG
25 CAPSULE ORAL ONCE
Qty: 0 | Refills: 0 | Status: COMPLETED | OUTPATIENT
Start: 2017-11-14 | End: 2017-11-14

## 2017-11-14 RX ORDER — ACETAMINOPHEN 500 MG
1000 TABLET ORAL ONCE
Qty: 0 | Refills: 0 | Status: COMPLETED | OUTPATIENT
Start: 2017-11-14 | End: 2017-11-14

## 2017-11-14 RX ORDER — SODIUM CHLORIDE 9 MG/ML
500 INJECTION INTRAMUSCULAR; INTRAVENOUS; SUBCUTANEOUS ONCE
Qty: 0 | Refills: 0 | Status: COMPLETED | OUTPATIENT
Start: 2017-11-14 | End: 2017-11-14

## 2017-11-14 RX ADMIN — OXYCODONE AND ACETAMINOPHEN 1 TABLET(S): 5; 325 TABLET ORAL at 13:27

## 2017-11-14 RX ADMIN — OXYCODONE AND ACETAMINOPHEN 1 TABLET(S): 5; 325 TABLET ORAL at 15:14

## 2017-11-14 RX ADMIN — Medication 25 MILLIGRAM(S): at 16:45

## 2017-11-14 RX ADMIN — SODIUM CHLORIDE 500 MILLILITER(S): 9 INJECTION INTRAMUSCULAR; INTRAVENOUS; SUBCUTANEOUS at 13:26

## 2017-11-14 RX ADMIN — Medication 400 MILLIGRAM(S): at 16:45

## 2017-11-14 RX ADMIN — OXYCODONE HYDROCHLORIDE 5 MILLIGRAM(S): 5 TABLET ORAL at 16:14

## 2017-11-14 NOTE — ED PROVIDER NOTE - MUSCULOSKELETAL MINIMAL EXAM
moving all extremities, nonspecific tenderness to palpation b/l distal lower extremities moving all extremities, nonspecific tenderness to palpation b/l distal lower extremities, 1+ pitting edema of RLE. no joint TTP. no evidence of cellulitis.

## 2017-11-14 NOTE — ED PROVIDER NOTE - PROGRESS NOTE DETAILS
Spoke to Dr. De Souza. we discussed teri's presentation and all diagnotic testing results. He said that teri could be discharged and restarted on his hydroxyuria BID and f/u in the office in 2 days. Spoke to Dr. De Souza. we discussed teri's presentation and all diagnotic testing results. He agreed with 1 bag platelet transfusion and said that patient could be discharged and restarted on his hydroxyuria 1tab  BID and f/u in the office in 2 days. He did Not believe patient needed to be admitted. The patient was discharged from the ED in stable condition. All results of today's workup were discussed with the patient and all questions/concerns were addressed. All discharge instructions were thoroughly discussed with the patient, as well as important warning signs and new/ worsening symptoms which should necessitate patient's immediate return to the ED. The patient is agreeable with discharge and expresses full understanding of all instructions given.

## 2017-11-14 NOTE — ED ADULT NURSE REASSESSMENT NOTE - NS ED NURSE REASSESS COMMENT FT1
Platelets given. Consent in chart. Risks and benefits explained to patient. Patient verbalized understanding of risks and benefits. Patient aware of possible side effects. Vital signs stable. Second RN at bedside for confirmation.

## 2017-11-14 NOTE — ED ADULT NURSE NOTE - OBJECTIVE STATEMENT
Pt 67 yr old F brought by EMS c/o increase SOB, weakness in legs, and R side. A&Ox3 has hx of DM and leukemia. Pt d/c from [facility] Sunday from having chemo. Pt stated he has recently fallen 2 weeks ago and still has soreness on his R ankle. Denies chest pain, ha, n/v/d, abdominal pain, f/c, urinary symptoms, hematuria. As per pt request, pic line will be accessed after confirm x-ray, dressing clean and dry. Pt resting comfortably with VSS. Patient's bed in the lowest position, explained plan of care to patient and family members. Will continue to reassess. Pt 67 yr old F brought by EMS c/o increase SOB, weakness in legs, and R side. A&Ox3 has hx of DM and leukemia. Pt d/c from [facility] Sunday from having chemo. Pt stated he has recently fallen 2 weeks ago and still has soreness on his R ankle. Denies chest pain, ha, n/v/d, abdominal pain, f/c, urinary symptoms, hematuria. Pt resting comfortably with VSS. Patient's bed in the lowest position, explained plan of care to patient and family members. Will continue to reassess. 67 yr old M brought by EMS c/o increase SOB, weakness in legs and R side of body.  Pt states he fell two weeks ago on his right side, right ankle pain. Hx of DM and leukemia. Pt d/c from [facility] Sunday after receiving chemo treatment. Denies chest pain, ha, n/v/d, abdominal pain, f/c, urinary symptoms, hematuria.   A&Ox4, vss, maex4, swelling noted to right ankle, bruises noted to belly d/t Lovenox injections, abd obese and nontender to touch, c/o diarrhea after taking PO chemo medication. Pt resting comfortably with VSS. Patient's bed in the lowest position, explained plan of care to patient and family members. Will continue to reassess.

## 2017-11-14 NOTE — ED PROVIDER NOTE - CARE PLAN
Principal Discharge DX:	Leukemia not having achieved remission, unspecified leukemia type  Secondary Diagnosis:	Thrombocytopenia Principal Discharge DX:	Leukemia not having achieved remission, unspecified leukemia type  Secondary Diagnosis:	Thrombocytopenia  Secondary Diagnosis:	Bilateral leg pain

## 2017-11-14 NOTE — ED ADULT NURSE NOTE - CHPI ED SYMPTOMS NEG
no numbness/no change in level of consciousness/no dizziness/no nausea/no fever/no loss of consciousness/no confusion/no blurred vision/no vomiting

## 2017-11-14 NOTE — ED PROVIDER NOTE - OBJECTIVE STATEMENT
66 yo M w/PMH DM, AML (on chemo) presenting for lower extremity pain. For past 3 days pt has had increased pain in his b/l legs/ankle/feet and generalized weakness w/inability to walk. Fall 2 weeks ago with bruise to distal right lower extremity. Patient's PMD Dr. De Souza also claims pt with elevated WBC count (from report 2 days ago). Denies fevers, cough or any other complaints.   Hx obtained from prior notes: AML progressed from MDS. Admitted for anemia and transfused 4 U PRBC and 1 of platelets. Pt with chronic diarrhea. Discharged on oral Venetoclax chemotherapy. Pt admitted 11/2 and discharged 11/12. D/C H/H 9.3/28, platelets 28. 68 yo M w/PMH DM, AML/MDS (on chemo) presenting for lower extremity pain. For past 3 days pt has had increased pain in his b/l legs/ankle/feet and generalized weakness w/inability to walk. Fall 2 weeks ago with bruise to distal right lower extremity. Patient's PMD Dr. De Souza also claims pt with elevated WBC count (from report 2 days ago). Denies fevers, cough or any other complaints.   Hx obtained from prior notes: AML progressed from MDS. Admitted for anemia on 11/2 and transfused 4 U PRBC and 1 of platelets. Pt with chronic diarrhea. Discharged on oral Venetoclax chemotherapy. Pt discharged 11/12. D/C H/H 9.3/28, platelets 28. 68 yo M w/PMH DM, AML/MDS (on chemo) presenting with bilateral lower extremity pain. For past 3 days pt has had increased pain in his b/l legs/ankle/feet and generalized weakness w/inability to walk secondary to the pain. No recent falls. 6/10 aching pain. Patient denies fevers, cough, chest pain, sob, or any other complaints.   Hx obtained from prior notes: AML progressed from MDS. Admitted for anemia on 11/2 and transfused 4 U PRBC and 1 of platelets. Pt with chronic diarrhea. Discharged on oral Venetoclax chemotherapy. Pt discharged 11/12. D/C H/H 9.3/28, platelets 28.

## 2017-11-14 NOTE — ED PROVIDER NOTE - MEDICAL DECISION MAKING DETAILS
68 yo on chemo presents with bilateral lower extremity pain. No recent injury. PE with no evidence of cellultiis. no focal TTP. mild swelling of RLE. VS stable.  ED workup reveals 68 yo on chemo presents with bilateral lower extremity pain. No recent injury. PE with no evidence of cellultiis. no focal TTP. mild swelling of RLE. VS stable.  ED workup reveals thrombocytopenia 9, compared to 28 on discharge 2 days ago. H/H stable. Leukocytosis 24 compared to 9, measured 2 days ago. JONNA at baseline. CXR with no infiltrate. US + for acute LLE DVT. No antiocoagulation initiated because of his thrombocytopenia. He already has an IVCF in place.   Patient was transfused wit h1 bag of platelets in the ER. He was given IVFs and percocet for pain. 68 yo on chemo presents with diffuse bilateral lower extremity pain. No recent injury. PE with no evidence of cellultiis. neurovascularly intact. no focal TTP. mild swelling of RLE. VS stable.    ED workup reveals plt 9, compared to 28 on discharge 2 days ago. H/H stable. Leukocytosis 24,  He has been in this range in the past on review of his blood work. JONNA at baseline.  EKG with no STEMI. Elevated troponin 0.08. On repeat it is stable 0.08. CXR with no infiltrate. US + for acute LLE DVT.  patient has a history of DVT in the past, however he is not a candidate for antiocoagulation because of his thrombocytopenia. He already has an IVCF in place  and does not have any signs or symptoms concerning for pulmonary embolism at this time.  Despite elevated troponin, it is stable on repeat, EKG is unchanged, he denies any chest pain or other ACS equivalent symptoms, and he has increased bleeding risk secondary to thrombocytopenia, so aspirin/other anticoagulation was not initiated. Patient was transfused 1 bag of platelets in the ER. He was given IVFs and percocet for pain  with good pain control.  After transfusion I had patient get up and ambulate and he does so without any difficulty, no evidence of lightheadedness or weakness. His vital signs remained stable. I consulted PMD, Dr. llanos.  I discussed all diagnostic results with him, he did not believe pt needed to be admitted and asked that I d/c pt with hydroxyurea BID and outpatient follow-up in  2 days. He also agreed that despite a positive DVT and positive troponin no anticoagulation  or other intervention  should be initiated at this time. Patient was discharged in stable condition with instructions to  resume taking his hydroxyurea and f/u with PMD in 2 days.

## 2017-11-15 ENCOUNTER — INPATIENT (INPATIENT)
Facility: HOSPITAL | Age: 67
LOS: 0 days | DRG: 834 | End: 2017-11-15
Attending: INTERNAL MEDICINE | Admitting: INTERNAL MEDICINE
Payer: COMMERCIAL

## 2017-11-15 VITALS — HEART RATE: 125 BPM | RESPIRATION RATE: 16 BRPM | DIASTOLIC BLOOD PRESSURE: 62 MMHG | SYSTOLIC BLOOD PRESSURE: 84 MMHG

## 2017-11-15 VITALS — HEART RATE: 140 BPM | OXYGEN SATURATION: 80 %

## 2017-11-15 DIAGNOSIS — A41.9 SEPSIS, UNSPECIFIED ORGANISM: ICD-10-CM

## 2017-11-15 DIAGNOSIS — C92.00 ACUTE MYELOBLASTIC LEUKEMIA, NOT HAVING ACHIEVED REMISSION: ICD-10-CM

## 2017-11-15 DIAGNOSIS — C95.90 LEUKEMIA, UNSPECIFIED NOT HAVING ACHIEVED REMISSION: ICD-10-CM

## 2017-11-15 DIAGNOSIS — J96.00 ACUTE RESPIRATORY FAILURE, UNSPECIFIED WHETHER WITH HYPOXIA OR HYPERCAPNIA: ICD-10-CM

## 2017-11-15 DIAGNOSIS — I46.9 CARDIAC ARREST, CAUSE UNSPECIFIED: ICD-10-CM

## 2017-11-15 DIAGNOSIS — E87.2 ACIDOSIS: ICD-10-CM

## 2017-11-15 LAB
ALBUMIN SERPL ELPH-MCNC: 3.1 G/DL — LOW (ref 3.3–5)
ALP SERPL-CCNC: 164 U/L — HIGH (ref 40–120)
ALT FLD-CCNC: 13 U/L RC — SIGNIFICANT CHANGE UP (ref 10–45)
ANION GAP SERPL CALC-SCNC: 31 MMOL/L — HIGH (ref 5–17)
APPEARANCE UR: ABNORMAL
APTT BLD: 37.4 SEC — SIGNIFICANT CHANGE UP (ref 27.5–37.4)
AST SERPL-CCNC: 28 U/L — SIGNIFICANT CHANGE UP (ref 10–40)
BASOPHILS # BLD AUTO: 0.2 K/UL — SIGNIFICANT CHANGE UP (ref 0–0.2)
BILIRUB SERPL-MCNC: 1.2 MG/DL — SIGNIFICANT CHANGE UP (ref 0.2–1.2)
BILIRUB UR-MCNC: NEGATIVE — SIGNIFICANT CHANGE UP
BLD GP AB SCN SERPL QL: NEGATIVE — SIGNIFICANT CHANGE UP
BUN SERPL-MCNC: 33 MG/DL — HIGH (ref 7–23)
CALCIUM SERPL-MCNC: 10.1 MG/DL — SIGNIFICANT CHANGE UP (ref 8.4–10.5)
CHLORIDE SERPL-SCNC: 105 MMOL/L — SIGNIFICANT CHANGE UP (ref 96–108)
CK MB BLD-MCNC: 6.1 % — HIGH (ref 0–3.5)
CK MB CFR SERPL CALC: 2.8 NG/ML — SIGNIFICANT CHANGE UP (ref 0–6.7)
CK SERPL-CCNC: 46 U/L — SIGNIFICANT CHANGE UP (ref 30–200)
CO2 SERPL-SCNC: 10 MMOL/L — CRITICAL LOW (ref 22–31)
COLOR SPEC: YELLOW — SIGNIFICANT CHANGE UP
CREAT SERPL-MCNC: 2.4 MG/DL — HIGH (ref 0.5–1.3)
DIFF PNL FLD: ABNORMAL
EOSINOPHIL # BLD AUTO: 0.1 K/UL — SIGNIFICANT CHANGE UP (ref 0–0.5)
GAS PNL BLDV: SIGNIFICANT CHANGE UP
GLUCOSE SERPL-MCNC: 66 MG/DL — LOW (ref 70–99)
GLUCOSE UR QL: NEGATIVE — SIGNIFICANT CHANGE UP
HCT VFR BLD CALC: 29.2 % — LOW (ref 39–50)
HGB BLD-MCNC: 9.3 G/DL — LOW (ref 13–17)
INR BLD: 2.15 RATIO — HIGH (ref 0.88–1.16)
KETONES UR-MCNC: NEGATIVE — SIGNIFICANT CHANGE UP
LEUKOCYTE ESTERASE UR-ACNC: NEGATIVE — SIGNIFICANT CHANGE UP
LYMPHOCYTES # BLD AUTO: 5 % — LOW (ref 13–44)
LYMPHOCYTES # BLD AUTO: 6.4 K/UL — HIGH (ref 1–3.3)
MCHC RBC-ENTMCNC: 31.3 PG — SIGNIFICANT CHANGE UP (ref 27–34)
MCHC RBC-ENTMCNC: 31.8 GM/DL — LOW (ref 32–36)
MCV RBC AUTO: 98.6 FL — SIGNIFICANT CHANGE UP (ref 80–100)
MONOCYTES # BLD AUTO: SIGNIFICANT CHANGE UP (ref 0–0.9)
MONOCYTES NFR BLD AUTO: 15 % — HIGH (ref 2–14)
NEUTROPHILS # BLD AUTO: SIGNIFICANT CHANGE UP (ref 1.8–7.4)
NEUTROPHILS NFR BLD AUTO: 2 % — LOW (ref 43–77)
NITRITE UR-MCNC: NEGATIVE — SIGNIFICANT CHANGE UP
NT-PROBNP SERPL-SCNC: HIGH PG/ML (ref 0–300)
PH UR: 5.5 — SIGNIFICANT CHANGE UP (ref 5–8)
PLATELET # BLD AUTO: 11 K/UL — CRITICAL LOW (ref 150–400)
POTASSIUM SERPL-MCNC: 2.9 MMOL/L — CRITICAL LOW (ref 3.5–5.3)
POTASSIUM SERPL-SCNC: 2.9 MMOL/L — CRITICAL LOW (ref 3.5–5.3)
PROT SERPL-MCNC: 5.2 G/DL — LOW (ref 6–8.3)
PROT UR-MCNC: 100 MG/DL
PROTHROM AB SERPL-ACNC: 23.6 SEC — HIGH (ref 9.8–12.7)
RBC # BLD: 2.96 M/UL — LOW (ref 4.2–5.8)
RBC # FLD: 18.9 % — HIGH (ref 10.3–14.5)
RH IG SCN BLD-IMP: POSITIVE — SIGNIFICANT CHANGE UP
SODIUM SERPL-SCNC: 146 MMOL/L — HIGH (ref 135–145)
SP GR SPEC: 1.02 — SIGNIFICANT CHANGE UP (ref 1.01–1.02)
TROPONIN T SERPL-MCNC: 0.29 NG/ML — HIGH (ref 0–0.06)
UROBILINOGEN FLD QL: NEGATIVE — SIGNIFICANT CHANGE UP
WBC # BLD: 128 K/UL — CRITICAL HIGH (ref 3.8–10.5)
WBC # FLD AUTO: 128 K/UL — CRITICAL HIGH (ref 3.8–10.5)

## 2017-11-15 PROCEDURE — 84550 ASSAY OF BLOOD/URIC ACID: CPT

## 2017-11-15 PROCEDURE — 83880 ASSAY OF NATRIURETIC PEPTIDE: CPT

## 2017-11-15 PROCEDURE — 85610 PROTHROMBIN TIME: CPT

## 2017-11-15 PROCEDURE — 51702 INSERT TEMP BLADDER CATH: CPT | Mod: XU

## 2017-11-15 PROCEDURE — 92950 HEART/LUNG RESUSCITATION CPR: CPT

## 2017-11-15 PROCEDURE — P9037: CPT

## 2017-11-15 PROCEDURE — 99285 EMERGENCY DEPT VISIT HI MDM: CPT | Mod: 25

## 2017-11-15 PROCEDURE — 96375 TX/PRO/DX INJ NEW DRUG ADDON: CPT

## 2017-11-15 PROCEDURE — 83615 LACTATE (LD) (LDH) ENZYME: CPT

## 2017-11-15 PROCEDURE — 86900 BLOOD TYPING SEROLOGIC ABO: CPT

## 2017-11-15 PROCEDURE — 36556 INSERT NON-TUNNEL CV CATH: CPT

## 2017-11-15 PROCEDURE — 82947 ASSAY GLUCOSE BLOOD QUANT: CPT

## 2017-11-15 PROCEDURE — 80053 COMPREHEN METABOLIC PANEL: CPT

## 2017-11-15 PROCEDURE — 84295 ASSAY OF SERUM SODIUM: CPT

## 2017-11-15 PROCEDURE — 82330 ASSAY OF CALCIUM: CPT

## 2017-11-15 PROCEDURE — 85730 THROMBOPLASTIN TIME PARTIAL: CPT

## 2017-11-15 PROCEDURE — 83605 ASSAY OF LACTIC ACID: CPT

## 2017-11-15 PROCEDURE — 76937 US GUIDE VASCULAR ACCESS: CPT | Mod: 26

## 2017-11-15 PROCEDURE — 82803 BLOOD GASES ANY COMBINATION: CPT

## 2017-11-15 PROCEDURE — 86901 BLOOD TYPING SEROLOGIC RH(D): CPT

## 2017-11-15 PROCEDURE — 81001 URINALYSIS AUTO W/SCOPE: CPT

## 2017-11-15 PROCEDURE — 85014 HEMATOCRIT: CPT

## 2017-11-15 PROCEDURE — 93010 ELECTROCARDIOGRAM REPORT: CPT | Mod: 59

## 2017-11-15 PROCEDURE — 87086 URINE CULTURE/COLONY COUNT: CPT

## 2017-11-15 PROCEDURE — 31500 INSERT EMERGENCY AIRWAY: CPT

## 2017-11-15 PROCEDURE — 76937 US GUIDE VASCULAR ACCESS: CPT

## 2017-11-15 PROCEDURE — 36430 TRANSFUSION BLD/BLD COMPNT: CPT

## 2017-11-15 PROCEDURE — 84132 ASSAY OF SERUM POTASSIUM: CPT

## 2017-11-15 PROCEDURE — 83735 ASSAY OF MAGNESIUM: CPT

## 2017-11-15 PROCEDURE — 85027 COMPLETE CBC AUTOMATED: CPT

## 2017-11-15 PROCEDURE — 93005 ELECTROCARDIOGRAM TRACING: CPT | Mod: XU

## 2017-11-15 PROCEDURE — 96374 THER/PROPH/DIAG INJ IV PUSH: CPT

## 2017-11-15 PROCEDURE — 84100 ASSAY OF PHOSPHORUS: CPT

## 2017-11-15 PROCEDURE — 99284 EMERGENCY DEPT VISIT MOD MDM: CPT | Mod: 25

## 2017-11-15 PROCEDURE — 93971 EXTREMITY STUDY: CPT

## 2017-11-15 PROCEDURE — 87040 BLOOD CULTURE FOR BACTERIA: CPT

## 2017-11-15 PROCEDURE — 84484 ASSAY OF TROPONIN QUANT: CPT

## 2017-11-15 PROCEDURE — 86850 RBC ANTIBODY SCREEN: CPT

## 2017-11-15 PROCEDURE — 71010: CPT | Mod: 26

## 2017-11-15 PROCEDURE — 82435 ASSAY OF BLOOD CHLORIDE: CPT

## 2017-11-15 PROCEDURE — 99291 CRITICAL CARE FIRST HOUR: CPT | Mod: 25

## 2017-11-15 PROCEDURE — 71045 X-RAY EXAM CHEST 1 VIEW: CPT

## 2017-11-15 PROCEDURE — 82550 ASSAY OF CK (CPK): CPT

## 2017-11-15 PROCEDURE — 82553 CREATINE MB FRACTION: CPT

## 2017-11-15 RX ORDER — SODIUM CHLORIDE 9 MG/ML
1000 INJECTION INTRAMUSCULAR; INTRAVENOUS; SUBCUTANEOUS ONCE
Qty: 0 | Refills: 0 | Status: COMPLETED | OUTPATIENT
Start: 2017-11-15 | End: 2017-11-15

## 2017-11-15 RX ORDER — SODIUM BICARBONATE 1 MEQ/ML
50 SYRINGE (ML) INTRAVENOUS ONCE
Qty: 0 | Refills: 0 | Status: COMPLETED | OUTPATIENT
Start: 2017-11-15 | End: 2017-11-15

## 2017-11-15 RX ORDER — EPINEPHRINE 0.3 MG/.3ML
0.1 INJECTION INTRAMUSCULAR; SUBCUTANEOUS
Qty: 4 | Refills: 0 | Status: DISCONTINUED | OUTPATIENT
Start: 2017-11-15 | End: 2017-11-15

## 2017-11-15 RX ORDER — EPINEPHRINE 0.3 MG/.3ML
1 INJECTION INTRAMUSCULAR; SUBCUTANEOUS ONCE
Qty: 0 | Refills: 0 | Status: COMPLETED | OUTPATIENT
Start: 2017-11-15 | End: 2017-11-15

## 2017-11-15 RX ORDER — AMIODARONE HYDROCHLORIDE 400 MG/1
1 TABLET ORAL
Qty: 900 | Refills: 0 | Status: DISCONTINUED | OUTPATIENT
Start: 2017-11-15 | End: 2017-11-15

## 2017-11-15 RX ORDER — ROCURONIUM BROMIDE 10 MG/ML
100 VIAL (ML) INTRAVENOUS ONCE
Qty: 0 | Refills: 0 | Status: COMPLETED | OUTPATIENT
Start: 2017-11-15 | End: 2017-11-15

## 2017-11-15 RX ORDER — PIPERACILLIN AND TAZOBACTAM 4; .5 G/20ML; G/20ML
3.38 INJECTION, POWDER, LYOPHILIZED, FOR SOLUTION INTRAVENOUS ONCE
Qty: 0 | Refills: 0 | Status: COMPLETED | OUTPATIENT
Start: 2017-11-15 | End: 2017-11-15

## 2017-11-15 RX ORDER — POTASSIUM CHLORIDE 20 MEQ
10 PACKET (EA) ORAL ONCE
Qty: 0 | Refills: 0 | Status: COMPLETED | OUTPATIENT
Start: 2017-11-15 | End: 2017-11-15

## 2017-11-15 RX ORDER — NOREPINEPHRINE BITARTRATE/D5W 8 MG/250ML
0.05 PLASTIC BAG, INJECTION (ML) INTRAVENOUS
Qty: 16 | Refills: 0 | Status: DISCONTINUED | OUTPATIENT
Start: 2017-11-15 | End: 2017-11-15

## 2017-11-15 RX ORDER — ETOMIDATE 2 MG/ML
40 INJECTION INTRAVENOUS ONCE
Qty: 0 | Refills: 0 | Status: DISCONTINUED | OUTPATIENT
Start: 2017-11-15 | End: 2017-11-15

## 2017-11-15 RX ORDER — AMIODARONE HYDROCHLORIDE 400 MG/1
150 TABLET ORAL ONCE
Qty: 0 | Refills: 0 | Status: COMPLETED | OUTPATIENT
Start: 2017-11-15 | End: 2017-11-15

## 2017-11-15 RX ORDER — VANCOMYCIN HCL 1 G
1000 VIAL (EA) INTRAVENOUS ONCE
Qty: 0 | Refills: 0 | Status: COMPLETED | OUTPATIENT
Start: 2017-11-15 | End: 2017-11-15

## 2017-11-15 RX ORDER — KETAMINE HYDROCHLORIDE 100 MG/ML
100 INJECTION INTRAMUSCULAR; INTRAVENOUS ONCE
Qty: 0 | Refills: 0 | Status: DISCONTINUED | OUTPATIENT
Start: 2017-11-15 | End: 2017-11-15

## 2017-11-15 RX ORDER — ACETAMINOPHEN 500 MG
1000 TABLET ORAL ONCE
Qty: 0 | Refills: 0 | Status: COMPLETED | OUTPATIENT
Start: 2017-11-15 | End: 2017-11-15

## 2017-11-15 RX ORDER — HYDROXYUREA 500 MG/1
2000 CAPSULE ORAL ONCE
Qty: 0 | Refills: 0 | Status: COMPLETED | OUTPATIENT
Start: 2017-11-15 | End: 2017-11-15

## 2017-11-15 RX ADMIN — SODIUM CHLORIDE 4000 MILLILITER(S): 9 INJECTION INTRAMUSCULAR; INTRAVENOUS; SUBCUTANEOUS at 13:04

## 2017-11-15 RX ADMIN — AMIODARONE HYDROCHLORIDE 600 MILLIGRAM(S): 400 TABLET ORAL at 15:10

## 2017-11-15 RX ADMIN — SODIUM CHLORIDE 1000 MILLILITER(S): 9 INJECTION INTRAMUSCULAR; INTRAVENOUS; SUBCUTANEOUS at 13:05

## 2017-11-15 RX ADMIN — Medication 50 MILLIEQUIVALENT(S): at 15:03

## 2017-11-15 RX ADMIN — Medication 400 MILLIGRAM(S): at 13:04

## 2017-11-15 RX ADMIN — Medication 4.69 MICROGRAM(S)/KG/MIN: at 15:22

## 2017-11-15 RX ADMIN — EPINEPHRINE 1 MILLIGRAM(S): 0.3 INJECTION INTRAMUSCULAR; SUBCUTANEOUS at 14:41

## 2017-11-15 RX ADMIN — EPINEPHRINE 1 MILLIGRAM(S): 0.3 INJECTION INTRAMUSCULAR; SUBCUTANEOUS at 15:02

## 2017-11-15 RX ADMIN — KETAMINE HYDROCHLORIDE 100 MILLIGRAM(S): 100 INJECTION INTRAMUSCULAR; INTRAVENOUS at 14:57

## 2017-11-15 RX ADMIN — PIPERACILLIN AND TAZOBACTAM 200 GRAM(S): 4; .5 INJECTION, POWDER, LYOPHILIZED, FOR SOLUTION INTRAVENOUS at 13:17

## 2017-11-15 RX ADMIN — EPINEPHRINE 1 MILLIGRAM(S): 0.3 INJECTION INTRAMUSCULAR; SUBCUTANEOUS at 15:13

## 2017-11-15 RX ADMIN — Medication 50 MILLIEQUIVALENT(S): at 14:44

## 2017-11-15 RX ADMIN — EPINEPHRINE 1 MILLIGRAM(S): 0.3 INJECTION INTRAMUSCULAR; SUBCUTANEOUS at 15:11

## 2017-11-15 RX ADMIN — EPINEPHRINE 1 MILLIGRAM(S): 0.3 INJECTION INTRAMUSCULAR; SUBCUTANEOUS at 14:44

## 2017-11-15 RX ADMIN — AMIODARONE HYDROCHLORIDE 33.33 MG/MIN: 400 TABLET ORAL at 15:22

## 2017-11-15 RX ADMIN — Medication 50 MILLIEQUIVALENT(S): at 15:11

## 2017-11-15 RX ADMIN — EPINEPHRINE 37.5 MICROGRAM(S)/KG/MIN: 0.3 INJECTION INTRAMUSCULAR; SUBCUTANEOUS at 15:22

## 2017-11-15 RX ADMIN — Medication 100 MILLIGRAM(S): at 14:57

## 2017-11-15 NOTE — ED PROVIDER NOTE - SKIN, MLM
Pallor. Old ecchymoses to abdominal wall and subcutaneous nodules from prior sc injections. Skin tear left forearm.

## 2017-11-15 NOTE — ED ADULT NURSE REASSESSMENT NOTE - NS ED NURSE REASSESS COMMENT FT1
@ 1438 pt became unresponsive with no pulse while 2 RNs at bedside, coding began at 1438, epi and bicarb administered. at 1445 return of pulse. At 1459 pt intubated with size 7 ET tube, 25 @ lip line; RSI meds administered by MD Negron - ketamine 100mg and rocuronium 100mg, +color change noted. @ 1503 compressions re-started for no pulse, pulse returned once again 1505. 1510 no pulse once again, pulse returned 1514. no pulse 1520, time of death called by MD Miller at 1522.   exp paperwork placed in chart.   see code flowsheet placed in chart.  All valuables given to family. @ 1438 pt became unresponsive with no pulse while 2 RNs at bedside, coding began at 1438, epi and bicarb administered. at 1445 return of pulse. At 1459 pt intubated with size 7 ET tube, 25 @ lip line;, +color change noted. @ 1503 compressions re-started for no pulse, pulse returned once again 1505. 1510 no pulse once again, pulse returned 1514. no pulse 1520, time of death called by MD Miller at 1522. see cardiac arrest flow sheet  exp paperwork placed in chart.   see code flowsheet placed in chart.  All valuables given to family.  post mortem care provided. @ 1438 pt became unresponsive with no pulse while 2 RNs at bedside; MD Miller and MD Negron made aware and @ bedside. ACLS protocol initiated, see cardiac arrest flowsheet placed in chart.  At 1459 pt intubated with size 7 ET tube, 25 @ lip line;, +color change noted, B/L breath sounds present. @ 1503 compressions re-started for no pulse, pulse returned once again 1505. 1510 no pulse once again, pulse returned 1514. no pulse 1520, time of death called by MD Miller at 1522.   All valuables given to family.  post mortem care provided. @ 1438 pt became unresponsive with no pulse while 2 RNs at bedside; MD Miller and MD Negron made aware and @ bedside. ACLS protocol initiated, see cardiac arrest flowsheet placed in chart.  at 1445 return pulse. At 1459 pt intubated with size 7 ET tube, 25 @ lip line;, +color change noted, B/L breath sounds present. @ 1503 compressions re-started for no pulse, pulse returned once again 1505. 1510 no pulse once again, pulse returned 1514. no pulse 1520, time of death called by MD Miller at 1522. All valuables given to family. post mortem care provided. @ 1438 pt became unresponsive with no pulse while 2 RNs at bedside; MD Miller and MD Negron made aware and @ bedside. ACLS protocol initiated, see cardiac arrest flowsheet placed in chart.  at 1445 return pulse. At 1459 pt intubated with size 7 ET tube, 25 @ lip line;, +color change noted, B/L breath sounds present. @ 1503 compressions re-started for no pulse, pulse returned once again 1505. 1510 no pulse once again, pulse returned 1514. no pulse 1520, time of death called by MD Miller at 1522. All valuables given to family. post mortem care provided. routine referral guide form filled out, placed in binder.

## 2017-11-15 NOTE — ED PROCEDURE NOTE - CPROC ED TIME OUT STATEMENT1
“Patient's name, , procedure and correct site were confirmed during the [place] Timeout.”
“Patient's name, , procedure and correct site were confirmed during the [place] Timeout.”

## 2017-11-15 NOTE — ED ADULT NURSE NOTE - OBJECTIVE STATEMENT
67 y.o M arrived via EMS c/o generalized weakness. A&Ox 67 y.o M arrived via EMS c/o generalized weakness. A&Ox3. as per pt wife, pt was in [facility] ED yesterday, received platelets, and released. Today pt felt weak, fell out of bed, +hit head unsure LOC, not on blood thinners. Pt has leukemia, is on chemo. PICC noted to L arm, as per MD Negron ok to use for labs and fluids as per MD order. tachypnic on arrival, abdomen soft, moves all extremities. Denies CP, nausea, vomiting, chills, HA, dizziness. Safety and comfort provided/maintained. MD @ bedside. 67 y.o M arrived via EMS c/o generalized weakness/ AMS. 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 as per pt wife, pt was in [facility] ED yesterday, received platelets, and released. once arrived home s/p d/c, pt lethargic. today pt fell out of bed, +hit head unsure LOC, not on blood thinners. tachypnic on arrival, abdomen soft, moves all extremities. pt confused, unable to provide history. PICC noted to L arm, as per MD Negron ok to use for labs and fluids as per MD order. As per EMS, pt hypotensive, no IV access obtained prior to arrival to ED. MD @ bedside on arrival. EKG performed, pt placed on cardiac monitoring. 67 y.o M arrived via EMS c/o generalized weakness/ AMS. 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 as per pt wife, pt was in [facility] ED yesterday, received platelets, and released. once arrived home s/p d/c, pt lethargic. today pt fell out of bed, +hit head unsure LOC, not on blood thinners. tachypnic on arrival, abdomen soft mottled in appearance, moves all extremities, skin tear noted to L forearm upon arrival. pt confused, unable to provide history. PICC noted to L arm, as per MD Migdalia pinon to use for labs and fluids as per MD order. As per EMS, pt hypotensive, no IV access obtained prior to arrival to ED. MD @ bedside on arrival. EKG performed, pt placed on cardiac monitoring. 67 y.o M arrived via EMS c/o generalized weakness/ AMS. 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 as per pt wife, pt was in [facility] ED yesterday, received platelets, and released. once arrived home s/p d/c, pt lethargic. today pt fell out of bed, +hit head unsure LOC, not on blood thinners. tachypnic on arrival, abdomen soft mottled in appearance, moves all extremities, skin tear noted to L forearm upon arrival. pt confused, unable to provide history. PICC noted to L arm, as per MD Migdalia pinon to use for labs and fluids as per MD order. As per EMS, pt hypotensive, no IV access obtained prior to arrival to ED. MD @ bedside on arrival. EKG performed, pt placed on cardiac monitoring. unable to assess safety concerns, abuse screen, suicide/homicide screen r/t pt acuity and AMS

## 2017-11-15 NOTE — ED PROCEDURE NOTE - CPROC ED INFORMED CONSENT1
This was an emergent procedure and consent was implied.
Benefits, risks, and possible complications of procedure explained to patient/caregiver who verbalized understanding and gave verbal consent.
Benefits, risks, and possible complications of procedure explained to patient/caregiver who verbalized understanding and gave verbal consent.

## 2017-11-15 NOTE — ED PROVIDER NOTE - NEUROLOGICAL, MLM
Alert but confused, following commands but inattentive, oriented to person occasionally; compatible with delirium.

## 2017-11-15 NOTE — ED PROVIDER NOTE - PROGRESS NOTE DETAILS
Bellis - delay in documentation due to pt acuity. Seen by me immediately on arrival. AML, recently hospitalized, presenting with AMS/lethargy. VS concerning for sepsis. Fluids emergently into PICC line. R arm peripheral access infiltrated. RIJ placed emergently for fluids, paucity of access. Abx started, fluids going through central line. Persistently hypotensive, pressors started. MICU consulted and accepted pt. Plan to improve hemodynamics and intubate due to tachypnea, anticipated to tire out. Shortly thereafter called to bedside, pt with agonal breathing, pulseless. Chest compressions started, coded for >30 minutes, refer to code sheet for details. Initially PEA, then in and out of VTach, intermittently pulseless. Levophed maxed out, epi gtt started, amio loaded due to VTach refractory to shock. After >30 minutes, family decided to terminate resuscitative efforts. Time of death 15:22. Bellis - delay in documentation due to pt acuity. Seen by me immediately on arrival. AML, recently hospitalized, presenting with AMS/lethargy. VS concerning for sepsis. Fluids emergently into PICC line. R arm peripheral access infiltrated. RIJ placed emergently for fluids, paucity of access. Abx started, fluids going through central line. Persistently hypotensive, pressors started. MICU consulted and accepted pt. Plan to improve hemodynamics and intubate due to tachypnea, anticipated to tire out. Shortly thereafter called to bedside, pt with agonal breathing, pulseless. Chest compressions started, coded for >30 minutes, refer to code sheet for details. Initially PEA, then in and out of VTach, intermittently pulseless. ROSC for short period, intubated required paralysis. Levophed maxed out, epi gtt started, amio loaded due to VTach refractory to shock. After >30 minutes, family decided to terminate resuscitative efforts. Time of death 15:22. attending Angela: Nursing unable to obtain peripheral IV access. Will attempt ultrasound guided access. attending Angela: Pt remains hypotensive despite 2L IVF resuscitation. Will place central access and start pressors. MICU consult placed, pending MICU evaluation in ED. Family informed of critical status. notified ROSCOE De Souza of pt's expiration

## 2017-11-15 NOTE — ED ADULT NURSE REASSESSMENT NOTE - NS ED NURSE REASSESS COMMENT FT1
MD Negron at [facility] to establish central line access MD Negron and MD Cabrales at bedside to establish central line access. line access established in R IJ. see central line insertion checklist placed in chart.

## 2017-11-15 NOTE — CONSULT NOTE ADULT - ASSESSMENT
66 y/o M with PMH of MDS s/p decytabine with conversion to AML on Venetoclax and Theodora -C, DM2, CAD(s/p stent), CKD3 presents with AMS likely 2/2 to acute blast crisis

## 2017-11-15 NOTE — CONSULT NOTE ADULT - ASSESSMENT
pt with known hx of cad with mds/aml with hypotension.  no sign of chf now  ivf  r/o sepsis icu evaluation  echo  abx  hold av blocking agent pt with known hx of cad with mds/aml with hypotension.  no sign of chf now  ivf  r/o sepsis icu evaluation  echo  abx  hold av blocking agent  Addendum  pt suddenly became unresponsive and found in  PEA arrest and coded by er . prognosis is poor.

## 2017-11-15 NOTE — CONSULT NOTE ADULT - SUBJECTIVE AND OBJECTIVE BOX
CHIEF COMPLAINT:Patient is a 67y old  Male who presents with a chief complaint of hypotension.    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 had initial excellent response to decytabine  then    progressed to acute leukemia  He was treated on a clinical trial at [facility] ,  followed by rapid relapse  Given his history of MDS and comorbid conditions--CAD, obesity, CKD 3, he is not candidate for standard chemotherapy  Venetoclax and Theodora -C have been recommended by Dr Hess at [facility] given recent positive experience with this regimen  Patient had  chemo, on  last admission here,  after obtaining the Venetoclax from his pharmacy  Patient is on posoconazole and so dose of venetoclax is reduced markedly from 600 mg daily to 150 mg daily  in er pt found in with hypotension, no chest pain. no sob.      PAST MEDICAL & SURGICAL HISTORY:  Diabetes  Leukemia      MEDICATIONS  (STANDING):  hydroxyurea 2000 milliGRAM(s) Oral Once  potassium chloride  10 mEq/100 mL IVPB 10 milliEquivalent(s) IV Intermittent once  sodium chloride 0.9% Bolus 1000 milliLiter(s) IV Bolus once  sodium chloride 0.9% Bolus 1000 milliLiter(s) IV Bolus once  vancomycin  IVPB 1000 milliGRAM(s) IV Intermittent once    MEDICATIONS  (PRN):      FAMILY HISTORY:      SOCIAL HISTORY:    [ ] Non-smoker  [ ] Smoker  [ ] Alcohol    Allergies    No Known Allergies    Intolerances    	    REVIEW OF SYSTEMS:  CONSTITUTIONAL: No fever, weight loss, + fatigue  EYES: No eye pain, visual disturbances, or discharge  ENT:  No difficulty hearing, tinnitus, vertigo; No sinus or throat pain  NECK: No pain or stiffness  RESPIRATORY: No cough, wheezing, chills or hemoptysis; + Shortness of Breath  CARDIOVASCULAR: No chest pain, palpitations, passing out, dizziness, or leg swelling  GASTROINTESTINAL: No abdominal or epigastric pain. No nausea, vomiting, or hematemesis; No diarrhea or constipation. No melena or hematochezia.  GENITOURINARY: No dysuria, frequency, hematuria, or incontinence  NEUROLOGICAL: No headaches, memory loss, loss of strength, numbness, or tremors  SKIN: No itching, burning, rashes, or lesions   LYMPH Nodes: No enlarged glands  ENDOCRINE: No heat or cold intolerance; No hair loss  MUSCULOSKELETAL: No joint pain or swelling; No muscle, back, or extremity pain  PSYCHIATRIC: No depression, anxiety, mood swings, or difficulty sleeping  HEME/LYMPH: No easy bruising, or bleeding gums  ALLERGY AND IMMUNOLOGIC: No hives or eczema	    [ ] All others negative	  [ ] Unable to obtain    PHYSICAL EXAM:  T(C): 39.2 (11-15-17 @ 12:37), Max: 39.2 (11-15-17 @ 12:37)  HR: 133 (11-15-17 @ 13:45) (76 - 133)  BP: 70/57 (11-15-17 @ 13:45) (70/57 - 117/95)  RR: 26 (11-15-17 @ 13:45) (16 - 30)  SpO2: 97% (11-15-17 @ 13:45) (96% - 100%)  Wt(kg): --  I&O's Summary      Appearance: Normal	  HEENT:   Normal oral mucosa, PERRL, EOMI	  Lymphatic: No lymphadenopathy  Cardiovascular: Normal S1 S2, No JVD, + systolic murmurs, No edema  Respiratory: Lungs clear to auscultation	  Psychiatry: A & O x 3, Mood & affect appropriate  Gastrointestinal:  Soft, Non-tender, + BS	  Skin: No rashes, No ecchymoses, No cyanosis	  Neurologic: Non-focal  Extremities: Normal range of motion, No clubbing, cyanosis or edema  Vascular: Peripheral pulses palpable 2+ bilaterally    TELEMETRY: 	    ECG:  	  RADIOLOGY:  OTHER: 	  	  LABS:	 	    CARDIAC MARKERS:  CARDIAC MARKERS ( 15 Nov 2017 12:31 )  x     / 0.29 ng/mL / 46 U/L / x     / 2.8 ng/mL  CARDIAC MARKERS ( 14 Nov 2017 16:38 )  x     / 0.08 ng/mL / x     / x     / x      CARDIAC MARKERS ( 14 Nov 2017 13:32 )  x     / 0.08 ng/mL / 15 U/L / x     / x                                  9.3    128.0 )-----------( 72       ( 15 Nov 2017 12:31 )             29.2     11-15    146<H>  |  105  |  33<H>  ----------------------------<  66<L>  2.9<LL>   |  10<LL>  |  2.40<H>    Ca    10.1      15 Nov 2017 12:31  Phos  1.7     11-15  Mg     1.6     11-15    TPro  5.2<L>  /  Alb  3.1<L>  /  TBili  1.2  /  DBili  x   /  AST  28  /  ALT  13  /  AlkPhos  164<H>  11-15    proBNP: Serum Pro-Brain Natriuretic Peptide: 96668 pg/mL (11-15 @ 12:31)    Lipid Profile:   HgA1c:   TSH:   PT/INR - ( 15 Nov 2017 12:31 )   PT: 23.6 sec;   INR: 2.15 ratio         PTT - ( 15 Nov 2017 12:31 )  PTT:37.4 sec      < from: 12 Lead ECG (11.14.17 @ 11:37) >  NUS RHYTHM WITH 1ST DEGREE A-V BLOCK  LEFT AXIS DEVIATION  RIGHT BUNDLE BRANCH BLOCK  POSSIBLE LATERAL INFARCT , AGE UNDETERMINED  INFERIOR INFARCT , AGE UNDETERMINED
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 had initial excellent response to decytabine  then    progressed to acute leukemia  He was treated on a clinical trial at [facility] ,  followed by rapid relapse  Given his history of MDS and comorbid conditions--CAD, obesity, CKD 3, he is not candidate for standard chemotherapy  Venetoclax and Theodora -C have been recommended by Dr Hess at [facility] given recent positive experience with this regimen  Patient had  chemo, on  last admission here,  after obtaining the Venetoclax from his pharmacy  Patient is on posoconazole and so dose of venetoclax is reduced markedly from 600 mg daily to 150 mg daily  in  er,  critically ill,  with hypotension, fevers,  wbc  above 100,00 wife  at bedside,  icu called  REVIEW OF SYSTEMS:    CONSTITUTIONAL:  has weakness, fevers or chills  EYES/ENT: No visual changes;    NECK: No pain   RESPIRATORY: No cough, wheezing, hemoptysis; No shortness of breath  CARDIOVASCULAR: No chest pain or palpitations  GASTROINTESTINAL: No abdominal or epigastric pain. No nausea, vomiting, or hematemesis; No diarrhea or constipation. No melena or hematochezia.  GENITOURINARY: No dysuria, frequency   NEUROLOGICAL: No  focal  weakness  SKIN   No  rash  PSYCH    Alert  PHYSICAL EXAMINATION:  Vital Signs Last 24 Hrs  T(C): 39.2 (15 Nov 2017 12:37), Max: 39.2 (15 Nov 2017 12:37)  T(F): 102.5 (15 Nov 2017 12:37), Max: 102.5 (15 Nov 2017 12:37)  HR: 133 (15 Nov 2017 13:45) (76 - 133)  BP: 70/57 (15 Nov 2017 13:45) (70/57 - 117/95)  BP(mean): --  RR: 26 (15 Nov 2017 13:45) (16 - 30)  SpO2: 97% (15 Nov 2017 13:45) (96% - 100%)  CAPILLARY BLOOD GLUCOSE            GENERAL: NAD, well-developed  HEAD:  atraumatic, normocephalic  EYES: sclera anicteric  ENMT: mucous membranes moist  NECK: supple, No JVD  CHEST/LUNG:   few  rales,   HEART: normal S1, S2  ABDOMEN: BS+, soft, ND, NT   EXTREMITIES:  pulses palpable; no clubbing, cyanosis, or edema b/l LEs  NEURO: awake, alert, interactive; moves all extremities    PHYSICAL EXAMINATION:  Vital Signs Last 24 Hrs  T(C): 39.2 (15 Nov 2017 12:37), Max: 39.2 (15 Nov 2017 12:37)  T(F): 102.5 (15 Nov 2017 12:37), Max: 102.5 (15 Nov 2017 12:37)  HR: 133 (15 Nov 2017 13:45) (76 - 133)  BP: 70/57 (15 Nov 2017 13:45) (70/57 - 117/95)  BP(mean): --  RR: 26 (15 Nov 2017 13:45) (16 - 30)  SpO2: 97% (15 Nov 2017 13:45) (96% - 100%)  CAPILLARY BLOOD GLUCOSE      LABS:                        9.3    128.0 )-----------( x        ( 15 Nov 2017 12:31 )             29.2     11-15    146<H>  |  105  |  33<H>  ----------------------------<  66<L>  2.9<LL>   |  10<LL>  |  2.40<H>    Ca    10.1      15 Nov 2017 12:31  Phos  1.7     11-15  Mg     1.6     11-15    TPro  5.2<L>  /  Alb  3.1<L>  /  TBili  1.2  /  DBili  x   /  AST  28  /  ALT  13  /  AlkPhos  164<H>  11-15    PT/INR - ( 15 Nov 2017 12:31 )   PT: 23.6 sec;   INR: 2.15 ratio         PTT - ( 15 Nov 2017 12:31 )  PTT:37.4 sec  Urinalysis Basic - ( 15 Nov 2017 12:54 )    Color: Yellow / Appearance: SL Turbid / S.022 / pH: x  Gluc: x / Ketone: Negative  / Bili: Negative / Urobili: Negative   Blood: x / Protein: 100 mg/dL / Nitrite: Negative   Leuk Esterase: Negative / RBC: x / WBC 0-2 /HPF   Sq Epi: x / Non Sq Epi: Occasional /HPF / Bacteria: x          RADIOLOGY & ADDITIONAL TESTS:

## 2017-11-15 NOTE — ED PROCEDURE NOTE - PROCEDURE ADDITIONAL DETAILS
Emergency Department Focused Ultrasound performed at patient's bedside for educational purposes. The study will have a follow up study performed or was performed in the direct supervision of an ultrasound trained attending.
Pt unresponsive, pulseless. Coded as per ACLS, bagged and then intubated with airway protection. Difficulty passing tube through cords, attempted x3 with success, positive ETCO2.

## 2017-11-15 NOTE — CONSULT NOTE ADULT - SUBJECTIVE AND OBJECTIVE BOX
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 As per wife, yesterday afternoon pt was lethargic and unable to get up from bed. He visited the ED, was found to have platelet count of 9 and WBC count of 23. He was transfused a unit of platelets and discharged home. Early this morning, wife reports pt was slurring words and unable to move. In the ED, labs were notable for WBC of 128 up from 23 the day prior with a blast count of 64%. Also with a anion gap metabolic acidosis    PAST MEDICAL & SURGICAL HISTORY:  Diabetes  Leukemia      FAMILY HISTORY: Non-contributory      SOCIAL HISTORY:  Smoking: [X ] Never Smoked [ ] Former Smoker (__ packs x ___ years) [ ] Current Smoker  (__ packs x ___ years)  Substance Use: [X ] Never Used [ ] Used ____  EtOH Use: Social  Marital Status: [ ] Single [X ]  [ ]  [ ]   Advance Directives: Full code    Allergies    No Known Allergies    Intolerances        HOME MEDICATIONS:    REVIEW OF SYSTEMS:  Constitutional: [ ] negative [ ] fevers [ ] chills [ ] weight loss [ ] weight gain  HEENT: [ ] negative [ ] dry eyes [ ] eye irritation [ ] postnasal drip [ ] nasal congestion  CV: [ ] negative  [ ] chest pain [ ] orthopnea [ ] palpitations [ ] murmur  Resp: [ ] negative [ ] cough [ ] shortness of breath [ ] dyspnea [ ] wheezing [ ] sputum [ ] hemoptysis  GI: [ ] negative [ ] nausea [ ] vomiting [ ] diarrhea [ ] constipation [ ] abd pain [ ] dysphagia   : [ ] negative [ ] dysuria [ ] nocturia [ ] hematuria [ ] increased urinary frequency  Musculoskeletal: [ ] negative [ ] back pain [ ] myalgias [ ] arthralgias [ ] fracture  Skin: [ ] negative [ ] rash [ ] itch  Neurological: [ ] negative [ ] headache [ ] dizziness [ ] syncope [ ] weakness [ ] numbness  Psychiatric: [ ] negative [ ] anxiety [ ] depression  Endocrine: [ ] negative [ ] diabetes [ ] thyroid problem  Hematologic/Lymphatic: [ ] negative [ ] anemia [ ] bleeding problem  Allergic/Immunologic: [ ] negative [ ] itchy eyes [ ] nasal discharge [ ] hives [ ] angioedema  [ ] All other systems negative  [ X] Unable to assess ROS because intubated      OBJECTIVE:  ICU Vital Signs Last 24 Hrs  T(C): 39.2 (15 Nov 2017 12:37), Max: 39.2 (15 Nov 2017 12:37)  T(F): 102.5 (15 Nov 2017 12:37), Max: 102.5 (15 Nov 2017 12:37)  HR: 133 (15 Nov 2017 13:45) (76 - 133)  BP: 70/57 (15 Nov 2017 13:45) (70/57 - 117/95)  BP(mean): --  ABP: --  ABP(mean): --  RR: 26 (15 Nov 2017 13:45) (16 - 30)  SpO2: 97% (15 Nov 2017 13:45) (96% - 100%)        CAPILLARY BLOOD GLUCOSE          PHYSICAL EXAM:  General: lethargic, responsive to deep sternal rub  HEENT: NC/AT  Lymph Nodes: non palpable  Neck: supple  Respiratory: Coarse BS b/l, using accessory respiratory muscles on exam  Cardiovascular: RRR, +s1/s2  Abdomen: obese, soft, distended +BS  Extremities: palpable pulses, trace pedal edema   Skin: warm, ecchymosis on abdomen  Neurological: AOxO  Psychiatry: altered, lethargic    LINES: PIV,     HOSPITAL MEDICATIONS:        LABS:                        9.3    128.0 )-----------( 72       ( 15 Nov 2017 12:31 )             29.2     Hgb Trend: 9.3<--, 9.3<--, 9.3<--, 9.8<--, 10.8<--  11-15    146<H>  |  105  |  33<H>  ----------------------------<  66<L>  2.9<LL>   |  10<LL>  |  2.40<H>    Ca    10.1      15 Nov 2017 12:31  Phos  1.7     11-15  Mg     1.6     11-15    TPro  5.2<L>  /  Alb  3.1<L>  /  TBili  1.2  /  DBili  x   /  AST  28  /  ALT  13  /  AlkPhos  164<H>  11-15    Creatinine Trend: 2.40<--, 1.57<--, 1.62<--, 1.60<--, 1.45<--, 1.37<--  PT/INR - ( 15 Nov 2017 12:31 )   PT: 23.6 sec;   INR: 2.15 ratio         PTT - ( 15 Nov 2017 12:31 )  PTT:37.4 sec  Urinalysis Basic - ( 15 Nov 2017 12:54 )    Color: Yellow / Appearance: SL Turbid / S.022 / pH: x  Gluc: x / Ketone: Negative  / Bili: Negative / Urobili: Negative   Blood: x / Protein: 100 mg/dL / Nitrite: Negative   Leuk Esterase: Negative / RBC: x / WBC 0-2 /HPF   Sq Epi: x / Non Sq Epi: Occasional /HPF / Bacteria: x        Venous Blood Gas:  11-15 @ 14:29  6.93/32/23/6/18  VBG Lactate: 21.0  Venous Blood Gas:  11-15 @ 13:07  7.27/16/52/7  VBG Lactate: 18.0  Venous Blood Gas:  11-15 @ 12:31  7.26/28/17/15  VBG Lactate: 17.0  Venous Blood Gas:   @ 13:32  7.42/43/31//  VBG Lactate: 1.3      MICROBIOLOGY:     RADIOLOGY:  [ ] Reviewed and interpreted by me    EKG: CHIEF COMPLAINT: hypotension/AML    HPI: 66 y/o M with PMH of MDS s/p decytabine 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 As per wife, yesterday afternoon pt was lethargic and unable to get up from bed. He visited the ED, was found to have platelet count of 9 and WBC count of 23. He was transfused a unit of platelets and discharged home. Early this morning, wife reports pt was slurring words and unable to move. In the ED, labs were notable for WBC of 128 up from 23 the day prior with a blast count of 64%. Also with a anion gap metabolic acidosis with a lactate of 18 up from 1.3 yesterday and JONNA on CKD.   On exam, Vital Signs Last 24 Hrs  T(C): 39.2 (15 Nov 2017 12:37), Max: 39.2 (15 Nov 2017 12:37)  T(F): 102.5 (15 Nov 2017 12:37), Max: 102.5 (15 Nov 2017 12:37)  HR: 133 (15 Nov 2017 13:45) (76 - 133)  BP: 70/57 (15 Nov 2017 13:45) (70/57 - 117/95)  BP(mean): --  RR: 26 (15 Nov 2017 13:45) (16 - 30)  SpO2: 97% (15 Nov 2017 13:45) (96% - 100%)  Vitals notable for hypotension likely 2/2 to sepsis. Pt was treated with 1x dose vanc, zosyn, ofirmev and 3L of NS. Heme onc was alerted about need for plasmapheresis. Prior to transport, pt was intubated in the ED. He was started on levophed. Intubation was complicated by cardiac arrest. Pt was treated with multiple rounds of epi with ROSC. He then lost his pulse again and went into vtach. He was shocked multiple times and given amiodarone with ROSC. He coded once more and wife decided to make him DNR.     PAST MEDICAL & SURGICAL HISTORY:  Diabetes  Leukemia      FAMILY HISTORY: Non-contributory      SOCIAL HISTORY:  Smoking: [X ] Never Smoked [ ] Former Smoker (__ packs x ___ years) [ ] Current Smoker  (__ packs x ___ years)  Substance Use: [X ] Never Used [ ] Used ____  EtOH Use: Social  Marital Status: [ ] Single [X ]  [ ]  [ ]   Advance Directives: Full code    Allergies    No Known Allergies    Intolerances        HOME MEDICATIONS:    REVIEW OF SYSTEMS:  Constitutional: [ ] negative [ ] fevers [ ] chills [ ] weight loss [ ] weight gain  HEENT: [ ] negative [ ] dry eyes [ ] eye irritation [ ] postnasal drip [ ] nasal congestion  CV: [ ] negative  [ ] chest pain [ ] orthopnea [ ] palpitations [ ] murmur  Resp: [ ] negative [ ] cough [ ] shortness of breath [ ] dyspnea [ ] wheezing [ ] sputum [ ] hemoptysis  GI: [ ] negative [ ] nausea [ ] vomiting [ ] diarrhea [ ] constipation [ ] abd pain [ ] dysphagia   : [ ] negative [ ] dysuria [ ] nocturia [ ] hematuria [ ] increased urinary frequency  Musculoskeletal: [ ] negative [ ] back pain [ ] myalgias [ ] arthralgias [ ] fracture  Skin: [ ] negative [ ] rash [ ] itch  Neurological: [ ] negative [ ] headache [ ] dizziness [ ] syncope [ ] weakness [ ] numbness  Psychiatric: [ ] negative [ ] anxiety [ ] depression  Endocrine: [ ] negative [ ] diabetes [ ] thyroid problem  Hematologic/Lymphatic: [ ] negative [ ] anemia [ ] bleeding problem  Allergic/Immunologic: [ ] negative [ ] itchy eyes [ ] nasal discharge [ ] hives [ ] angioedema  [ ] All other systems negative  [ X] Unable to assess ROS because intubated      OBJECTIVE:  ICU Vital Signs Last 24 Hrs  T(C): 39.2 (15 Nov 2017 12:37), Max: 39.2 (15 Nov 2017 12:37)  T(F): 102.5 (15 Nov 2017 12:37), Max: 102.5 (15 Nov 2017 12:37)  HR: 133 (15 Nov 2017 13:45) (76 - 133)  BP: 70/57 (15 Nov 2017 13:45) (70/57 - 117/95)  BP(mean): --  ABP: --  ABP(mean): --  RR: 26 (15 Nov 2017 13:45) (16 - 30)  SpO2: 97% (15 Nov 2017 13:45) (96% - 100%)        CAPILLARY BLOOD GLUCOSE          PHYSICAL EXAM:  General: lethargic, responsive to deep sternal rub  HEENT: NC/AT  Lymph Nodes: non palpable  Neck: supple  Respiratory: Coarse BS b/l, using accessory respiratory muscles on exam  Cardiovascular: RRR, +s1/s2  Abdomen: obese, soft, distended +BS  Extremities: palpable pulses, trace pedal edema   Skin: warm, ecchymosis on abdomen  Neurological: AOxO  Psychiatry: altered, lethargic    LINES: PIV,     HOSPITAL MEDICATIONS:        LABS:                        9.3    128.0 )-----------( 72       ( 15 Nov 2017 12:31 )             29.2     Hgb Trend: 9.3<--, 9.3<--, 9.3<--, 9.8<--, 10.8<--  1115    146<H>  |  105  |  33<H>  ----------------------------<  66<L>  2.9<LL>   |  10<LL>  |  2.40<H>    Ca    10.1      15 Nov 2017 12:31  Phos  1.7     11-15  Mg     1.6     11-15    TPro  5.2<L>  /  Alb  3.1<L>  /  TBili  1.2  /  DBili  x   /  AST  28  /  ALT  13  /  AlkPhos  164<H>  11-15    Creatinine Trend: 2.40<--, 1.57<--, 1.62<--, 1.60<--, 1.45<--, 1.37<--  PT/INR - ( 15 Nov 2017 12:31 )   PT: 23.6 sec;   INR: 2.15 ratio         PTT - ( 15 Nov 2017 12:31 )  PTT:37.4 sec  Urinalysis Basic - ( 15 Nov 2017 12:54 )    Color: Yellow / Appearance: SL Turbid / S.022 / pH: x  Gluc: x / Ketone: Negative  / Bili: Negative / Urobili: Negative   Blood: x / Protein: 100 mg/dL / Nitrite: Negative   Leuk Esterase: Negative / RBC: x / WBC 0-2 /HPF   Sq Epi: x / Non Sq Epi: Occasional /HPF / Bacteria: x        Venous Blood Gas:  11-15 @ 14:29  6.93/32/23/6/18  VBG Lactate: 21.0  Venous Blood Gas:  11-15 @ 13:07  7.27/16///  VBG Lactate: 18.0  Venous Blood Gas:  -15 @ 12:31  7./28/17//15  VBG Lactate: 17.0  Venous Blood Gas:  14 @ 13:32  7.42/43///  VBG Lactate: 1.3      MICROBIOLOGY:     RADIOLOGY:  [X ] Reviewed and interpreted by me

## 2017-11-15 NOTE — ED PROVIDER NOTE - CRITICAL CARE PROVIDED
conducted a detailed discussion of DNR status/additional history taking/consultation with other physicians/direct patient care (not related to procedure)/interpretation of diagnostic studies/consult w/ pt's family directly relating to pts condition/documentation

## 2017-11-15 NOTE — ED PROVIDER NOTE - ATTENDING CONTRIBUTION TO CARE
attending Angela: 67yM h/o AML/MDS on venetoclax p/w generalized weakness and altered mental status today. Profound fatigue unable to get out of bed. Seen in ED yesterday with complaints of leg pain and found to be thrombocytopenic. pt was transfused platelets and ED team consulted with private hematologist, pt was discharged after  transfusion. On arrival, pt hypotensive, tachycardic, febrile rectally 102.5, dry MM, skin mottling. Severe sepsis. Will obtain IV access, empiric broad spectrum abx, 30cc/kg IVF bolus, labs including vbg with lactate, blood cultures, cxr, urinalysis, urine culture, davis, ICU consultation and admission. If BP does not improve with IVF resuscitation will start pressors. As per wife, patient is full code.

## 2017-11-15 NOTE — ED ADULT NURSE REASSESSMENT NOTE - NS ED NURSE REASSESS COMMENT FT1
Indwelling Amos catheter placed as per MD order; 2 RNs at bedside during insertion; sterile technique utilized and maintained. Catheter securement device placed, catheter draining to gravity, 100 mL dark yellow urine drained. Pt tolerated well.   MD Mccarthy @ bedside to obtain US guided IV. Indwelling Amos catheter placed as per MD order; 2 RNs at bedside during insertion; sterile technique utilized and maintained. Catheter securement device placed, catheter draining to gravity, 50 mL cloudy yellow urine drained. Pt tolerated well.   MD Mccarthy @ bedside to obtain US guided IV.

## 2017-11-15 NOTE — ED PROCEDURE NOTE - CPROC ED INFUS LINE DETAIL1
Ultrasound guidance was used during placement./The location was identified, and the area was draped and prepped./All lumen(s) aspirated and flushed without difficulty./The catheter was placed using sterile technique./The guidewire was recovered.

## 2017-11-15 NOTE — ED PROCEDURE NOTE - CPROC ED TRACHE INTUB DETAIL1
Difficult/crash intubation (see additional details section)./Patient connected to ventilator with settings as ordered.

## 2017-11-15 NOTE — ED PROVIDER NOTE - OBJECTIVE STATEMENT
67 year old man PMH AML on venetoclax p/w altered mental status. Wife unable to get him out of bed, called EMS. Seen here yesterday, discharged after discussion with hematologist but wife says he has been much more lethargic since getting saul. Pt confused, unable to provide much history. Hypotensive per EMS, unable to obtain access or start fluids. No known preceding infectious symptoms.

## 2017-11-15 NOTE — CONSULT NOTE ADULT - ASSESSMENT
pt with weakness   fevers,  hypotension,  in  er  awaiting  icu  eval  AML  with  blasts,  at  present awaiting  blast  count  if  high, then  will need  pheresis  will  start  hydrea 2 grams, per  heme  anemia, thrombocytopenia  from  AML,  transfuse  as  needed  fevers,  blood/ urine  c/s,  needs cefepime/  vanco  continue  prophylactic agents  positive  troponin, noted,   ekg, sinus  tach   ID  and  card caled  ct  chest, if  stable  di,  needs iv  fluids,  bladder  scan  long term prognosis  , guarded pt with weakness   fevers,  hypotension,  in  er  awaiting  icu  eval  AML  with  blasts,  at  present awaiting  blast  count  if  high, then  will need  pheresis  will  start  hydrea 2 grams, per  heme  anemia, thrombocytopenia  from  AML,  transfuse  as  needed  fevers,  blood/ urine  c/s,  needs cefepime/  vanco  continue  prophylactic agents  positive  troponin, noted,   ekg, sinus  tach   ID  and  card caled  ct  chest, if  stable  di,  needs iv  fluids,  bladder  scan  hypokalemia, may be  spurious, from blasts,  known to  occur with  AML  long term prognosis  , guarded pt with weakness   fevers,  hypotension,  in  er  awaiting  icu  eval  AML  with  blasts,  at  present awaiting  blast  count  has  failed mlple regimens, per  heme  if  high, then  will need  pheresis  will  start  hydrea 2 grams, per  heme  anemia, thrombocytopenia  from  AML,  transfuse  as  needed  fevers,  blood/ urine  c/s,  needs cefepime/  vanco  continue  prophylactic agents  positive  troponin, noted,   ekg, sinus  tach   ID  and  card caled  ct  chest, if  stable  di,  needs iv  fluids,  bladder  scan  hypokalemia, may be  spurious, from blasts,  known to  occur with  AML   prognosis  , guarded  addendum,   pt now  with pulseless vfib,,  resuscitation in progress  blast  count of  60%   wife  outside   pts  room

## 2017-11-16 LAB
CULTURE RESULTS: SIGNIFICANT CHANGE UP
SPECIMEN SOURCE: SIGNIFICANT CHANGE UP

## 2017-11-20 LAB
CULTURE RESULTS: SIGNIFICANT CHANGE UP
CULTURE RESULTS: SIGNIFICANT CHANGE UP
SPECIMEN SOURCE: SIGNIFICANT CHANGE UP
SPECIMEN SOURCE: SIGNIFICANT CHANGE UP

## 2017-12-11 NOTE — CONSULT NOTE ADULT - PROBLEM SELECTOR PROBLEM 3
This office note has been dictated.     Metabolic acidosis with increased anion gap and accumulation of organic acids

## 2021-04-19 NOTE — ED ADULT NURSE NOTE - EENT WDL
Outreach attempt was made to schedule an Annual Wellness Visit. This was the first attempt. Contact was not made, left message.  
Eyes with no visual disturbances.  Ears clean and dry and no hearing difficulties. Nose with pink mucosa and no drainage.  Mouth mucous membranes moist and pink.  No tenderness or swelling to throat or neck.

## 2022-06-24 NOTE — PATIENT PROFILE ADULT. - FUNCTIONAL SCREEN CURRENT LEVEL: TRANSFERRING, MLM
Addended by: MARGARET KOCH on: 6/24/2022 02:13 PM     Modules accepted: Level of Service    
(2) assistive person